# Patient Record
Sex: MALE | Race: WHITE | URBAN - METROPOLITAN AREA
[De-identification: names, ages, dates, MRNs, and addresses within clinical notes are randomized per-mention and may not be internally consistent; named-entity substitution may affect disease eponyms.]

---

## 2018-06-17 ENCOUNTER — EMERGENCY (EMERGENCY)
Facility: HOSPITAL | Age: 73
LOS: 0 days | Discharge: HOME | End: 2018-06-17
Attending: STUDENT IN AN ORGANIZED HEALTH CARE EDUCATION/TRAINING PROGRAM
Payer: MEDICARE

## 2018-06-17 VITALS
RESPIRATION RATE: 17 BRPM | DIASTOLIC BLOOD PRESSURE: 86 MMHG | SYSTOLIC BLOOD PRESSURE: 141 MMHG | HEART RATE: 52 BPM | WEIGHT: 126.99 LBS | HEIGHT: 73 IN | OXYGEN SATURATION: 97 % | TEMPERATURE: 98 F

## 2018-06-17 VITALS
TEMPERATURE: 97 F | RESPIRATION RATE: 17 BRPM | HEART RATE: 67 BPM | DIASTOLIC BLOOD PRESSURE: 60 MMHG | OXYGEN SATURATION: 96 % | SYSTOLIC BLOOD PRESSURE: 157 MMHG

## 2018-06-17 DIAGNOSIS — S09.90XA UNSPECIFIED INJURY OF HEAD, INITIAL ENCOUNTER: ICD-10-CM

## 2018-06-17 DIAGNOSIS — S20.20XA CONTUSION OF THORAX, UNSPECIFIED, INITIAL ENCOUNTER: ICD-10-CM

## 2018-06-17 DIAGNOSIS — Z85.028 PERSONAL HISTORY OF OTHER MALIGNANT NEOPLASM OF STOMACH: ICD-10-CM

## 2018-06-17 DIAGNOSIS — I10 ESSENTIAL (PRIMARY) HYPERTENSION: ICD-10-CM
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
## 2018-06-17 PROCEDURE — 72125 CT NECK SPINE W/O DYE: CPT | Mod: 26

## 2018-06-17 PROCEDURE — 99284 EMERGENCY DEPT VISIT MOD MDM: CPT

## 2018-06-17 PROCEDURE — 71250 CT THORAX DX C-: CPT | Mod: 26

## 2018-06-17 PROCEDURE — 76377 3D RENDER W/INTRP POSTPROCES: CPT | Mod: 26

## 2018-06-17 PROCEDURE — 70450 CT HEAD/BRAIN W/O DYE: CPT | Mod: 26

## 2018-06-17 RX ORDER — TETANUS TOXOID, REDUCED DIPHTHERIA TOXOID AND ACELLULAR PERTUSSIS VACCINE, ADSORBED 5; 2.5; 8; 8; 2.5 [IU]/.5ML; [IU]/.5ML; UG/.5ML; UG/.5ML; UG/.5ML
0.5 SUSPENSION INTRAMUSCULAR ONCE
Qty: 0 | Refills: 0 | Status: COMPLETED | OUTPATIENT
Start: 2018-06-17 | End: 2018-06-17

## 2018-06-17 RX ORDER — TRAMADOL HYDROCHLORIDE 50 MG/1
50 TABLET ORAL ONCE
Qty: 0 | Refills: 0 | Status: DISCONTINUED | OUTPATIENT
Start: 2018-06-17 | End: 2018-06-17

## 2018-06-17 RX ADMIN — TETANUS TOXOID, REDUCED DIPHTHERIA TOXOID AND ACELLULAR PERTUSSIS VACCINE, ADSORBED 0.5 MILLILITER(S): 5; 2.5; 8; 8; 2.5 SUSPENSION INTRAMUSCULAR at 12:23

## 2018-06-17 RX ADMIN — TRAMADOL HYDROCHLORIDE 50 MILLIGRAM(S): 50 TABLET ORAL at 12:23

## 2018-06-17 RX ADMIN — TRAMADOL HYDROCHLORIDE 50 MILLIGRAM(S): 50 TABLET ORAL at 13:52

## 2018-06-17 NOTE — ED PROVIDER NOTE - SKIN, MLM
Skin normal color for race, warm, dry and intact. No evidence of rash. +skin tears bilateral arms +small area of erythema left mid anterior chest wall

## 2018-06-17 NOTE — ED PROVIDER NOTE - OBJECTIVE STATEMENT
73 year old male presents today s/o trip and fall at home, pt stepped up to go into another room when he lost his balance and fell forward striking his forehead 73 year old male presents today s/o trip and fall at home, pt stepped up to go into another room when he lost his balance and fell forward striking his forehead and left chest wall (-) loc (-) dizzy or lightheaded (-) nausea or vomiting  (-) sob (-) abdominal pain

## 2018-06-17 NOTE — ED ADULT NURSE NOTE - OBJECTIVE STATEMENT
patient received, alert and oriented x4, s/p slip and fall in the bathroom around 0300, patient missed a step, stated hit frontal head on a table, no lac no bleeding noted, denies taking blood thinner, noted skin tear to right arm. denies passing out, denies dizziness, denies n/v.

## 2018-06-17 NOTE — ED PROVIDER NOTE - MEDICAL DECISION MAKING DETAILS
pt presented s/p trip and fall striking his head and chest contusion, ct head shows some hypoattenuation possibly edema, ct chest shows bilateral pulmonary effusions and nodules suspicious for neoplasm, pt has a known h/o stomach cancer on chemo, he states that he recently had fluid removed from his lungs and has followup, he does not want to stay and will see his oncologist, pt told to follow up tomorrow, pt is alert and oriented x3

## 2018-06-17 NOTE — ED ADULT TRIAGE NOTE - CHIEF COMPLAINT QUOTE
Laceration to arms sustain after fall. The right arm skin tear tripped and fall at home coming from the bathroom at 3am this am.

## 2018-07-11 ENCOUNTER — INPATIENT (INPATIENT)
Facility: HOSPITAL | Age: 73
LOS: 5 days | Discharge: ANOTHER HOSPITAL | End: 2018-07-17
Attending: HOSPITALIST | Admitting: HOSPITALIST
Payer: MEDICARE

## 2018-07-11 VITALS
HEART RATE: 52 BPM | TEMPERATURE: 98 F | RESPIRATION RATE: 19 BRPM | DIASTOLIC BLOOD PRESSURE: 64 MMHG | OXYGEN SATURATION: 96 % | SYSTOLIC BLOOD PRESSURE: 137 MMHG | WEIGHT: 130.07 LBS

## 2018-07-11 DIAGNOSIS — R19.7 DIARRHEA, UNSPECIFIED: ICD-10-CM

## 2018-07-11 DIAGNOSIS — J91.0 MALIGNANT PLEURAL EFFUSION: ICD-10-CM

## 2018-07-11 LAB
ALBUMIN SERPL ELPH-MCNC: 2.8 G/DL — LOW (ref 3.3–5)
ALP SERPL-CCNC: 63 U/L — SIGNIFICANT CHANGE UP (ref 40–120)
ALT FLD-CCNC: 11 U/L — LOW (ref 12–78)
ANION GAP SERPL CALC-SCNC: 9 MMOL/L — SIGNIFICANT CHANGE UP (ref 5–17)
AST SERPL-CCNC: 16 U/L — SIGNIFICANT CHANGE UP (ref 15–37)
BILIRUB SERPL-MCNC: 0.5 MG/DL — SIGNIFICANT CHANGE UP (ref 0.2–1.2)
BUN SERPL-MCNC: 23 MG/DL — SIGNIFICANT CHANGE UP (ref 7–23)
CALCIUM SERPL-MCNC: 9.5 MG/DL — SIGNIFICANT CHANGE UP (ref 8.5–10.1)
CHLORIDE SERPL-SCNC: 107 MMOL/L — SIGNIFICANT CHANGE UP (ref 96–108)
CO2 SERPL-SCNC: 24 MMOL/L — SIGNIFICANT CHANGE UP (ref 22–31)
CREAT SERPL-MCNC: 1.01 MG/DL — SIGNIFICANT CHANGE UP (ref 0.5–1.3)
GLUCOSE SERPL-MCNC: 92 MG/DL — SIGNIFICANT CHANGE UP (ref 70–99)
HCT VFR BLD CALC: 35.9 % — LOW (ref 39–50)
HGB BLD-MCNC: 12.1 G/DL — LOW (ref 13–17)
MCHC RBC-ENTMCNC: 30.6 PG — SIGNIFICANT CHANGE UP (ref 27–34)
MCHC RBC-ENTMCNC: 33.7 GM/DL — SIGNIFICANT CHANGE UP (ref 32–36)
MCV RBC AUTO: 90.9 FL — SIGNIFICANT CHANGE UP (ref 80–100)
NRBC # BLD: 0 /100 WBCS — SIGNIFICANT CHANGE UP (ref 0–0)
PLATELET # BLD AUTO: 196 K/UL — SIGNIFICANT CHANGE UP (ref 150–400)
POTASSIUM SERPL-MCNC: 4.2 MMOL/L — SIGNIFICANT CHANGE UP (ref 3.5–5.3)
POTASSIUM SERPL-SCNC: 4.2 MMOL/L — SIGNIFICANT CHANGE UP (ref 3.5–5.3)
PROT SERPL-MCNC: 6.5 GM/DL — SIGNIFICANT CHANGE UP (ref 6–8.3)
RBC # BLD: 3.95 M/UL — LOW (ref 4.2–5.8)
RBC # FLD: 12.9 % — SIGNIFICANT CHANGE UP (ref 10.3–14.5)
SODIUM SERPL-SCNC: 140 MMOL/L — SIGNIFICANT CHANGE UP (ref 135–145)
WBC # BLD: 4.69 K/UL — SIGNIFICANT CHANGE UP (ref 3.8–10.5)
WBC # FLD AUTO: 4.69 K/UL — SIGNIFICANT CHANGE UP (ref 3.8–10.5)

## 2018-07-11 PROCEDURE — 93010 ELECTROCARDIOGRAM REPORT: CPT

## 2018-07-11 PROCEDURE — 99285 EMERGENCY DEPT VISIT HI MDM: CPT

## 2018-07-11 PROCEDURE — 99223 1ST HOSP IP/OBS HIGH 75: CPT

## 2018-07-11 PROCEDURE — 74177 CT ABD & PELVIS W/CONTRAST: CPT | Mod: 26

## 2018-07-11 RX ORDER — IOHEXOL 300 MG/ML
30 INJECTION, SOLUTION INTRAVENOUS ONCE
Qty: 0 | Refills: 0 | Status: COMPLETED | OUTPATIENT
Start: 2018-07-11 | End: 2018-07-11

## 2018-07-11 RX ORDER — CIPROFLOXACIN LACTATE 400MG/40ML
500 VIAL (ML) INTRAVENOUS ONCE
Qty: 0 | Refills: 0 | Status: COMPLETED | OUTPATIENT
Start: 2018-07-11 | End: 2018-07-11

## 2018-07-11 RX ORDER — METRONIDAZOLE 500 MG
500 TABLET ORAL ONCE
Qty: 0 | Refills: 0 | Status: COMPLETED | OUTPATIENT
Start: 2018-07-11 | End: 2018-07-11

## 2018-07-11 RX ORDER — ENOXAPARIN SODIUM 100 MG/ML
40 INJECTION SUBCUTANEOUS EVERY 24 HOURS
Qty: 0 | Refills: 0 | Status: DISCONTINUED | OUTPATIENT
Start: 2018-07-11 | End: 2018-07-17

## 2018-07-11 RX ORDER — OXYCODONE AND ACETAMINOPHEN 5; 325 MG/1; MG/1
1 TABLET ORAL EVERY 6 HOURS
Qty: 0 | Refills: 0 | Status: DISCONTINUED | OUTPATIENT
Start: 2018-07-11 | End: 2018-07-16

## 2018-07-11 RX ORDER — SODIUM CHLORIDE 9 MG/ML
1000 INJECTION INTRAMUSCULAR; INTRAVENOUS; SUBCUTANEOUS ONCE
Qty: 0 | Refills: 0 | Status: COMPLETED | OUTPATIENT
Start: 2018-07-11 | End: 2018-07-11

## 2018-07-11 RX ORDER — ONDANSETRON 8 MG/1
4 TABLET, FILM COATED ORAL EVERY 6 HOURS
Qty: 0 | Refills: 0 | Status: DISCONTINUED | OUTPATIENT
Start: 2018-07-11 | End: 2018-07-17

## 2018-07-11 RX ADMIN — IOHEXOL 30 MILLILITER(S): 300 INJECTION, SOLUTION INTRAVENOUS at 13:04

## 2018-07-11 RX ADMIN — Medication 500 MILLIGRAM(S): at 17:55

## 2018-07-11 RX ADMIN — SODIUM CHLORIDE 1000 MILLILITER(S): 9 INJECTION INTRAMUSCULAR; INTRAVENOUS; SUBCUTANEOUS at 13:04

## 2018-07-11 NOTE — H&P ADULT - ASSESSMENT
73m with mitotic history with diarrhea and family telling ems they cannot take care of him     IMPROVE VTE Individual Risk Assessment        RISK                                                          Points  [  ] Previous VTE                                                3  [  ] Thrombophilia                                             2  [  ] Lower limb paralysis                                   2        (unable to hold up >15 seconds)    [ x ] Current Cancer                                            2         (within 6 months)  [ x ] Immobilization > 24 hrs                              1  [  ] ICU/CCU stay > 24 hours                            1  [x  ] Age > 60                                                    1  IMPROVE VTE Score ______4___    lovenox

## 2018-07-11 NOTE — H&P ADULT - HISTORY OF PRESENT ILLNESS
· HPI Objective Statement: 72 yo M with diarrhea.  Pt. sent in by family because he was covered in feces.  Pt. admits to diarrhea, abd pain, no other complaints.  Pt. currently getting chemo for lung Ca.  Pt's family told EMS, "we can't clean him all the time,"    	ROS: negative for fever, cough, headache, chest pain, shortness of breath, abd pain, nausea, vomiting, diarrhea, rash, paresthesia, and weakness--all other systems reviewed are negative.   PMH: HTN, stomach cancer, ?lung cancer; Meds: See EMR; SH: Denies smoking/drinking/drug use

## 2018-07-11 NOTE — ED ADULT NURSE NOTE - ED STAT RN HANDOFF DETAILS
report given to hold RN. PT ALERT AND ORIENTED BY 3. PT DENIES ANY PAIN AND IS COMFORTABLE IN BED EATING DINNER.

## 2018-07-11 NOTE — ED PROVIDER NOTE - PHYSICAL EXAMINATION
Vitals: WNL  Gen: AAOx3, NAD, sitting comfortably in stretcher  Head: ncat, perrla, eomi b/l  Neck: supple, no lymphadenopathy, no midline deviation  Heart: rrr, no m/r/g  Lungs: CTA b/l, no rales/ronchi/wheezes  Abd: soft, nontender, non-distended, no rebound or guarding  Ext: no clubbing/cyanosis/edema, tried stool on hands (brown)  Neuro: sensation and muscle strength intact b/l, moving all 4 ext, no focal deficits, CN2-12 intact b/l

## 2018-07-11 NOTE — H&P ADULT - NSHPLABSRESULTS_GEN_ALL_CORE
12.1   4.69  )-----------( 196      ( 11 Jul 2018 12:58 )             35.9   07-11    140  |  107  |  23  ----------------------------<  92  4.2   |  24  |  1.01    Ca    9.5      11 Jul 2018 12:58    TPro  6.5  /  Alb  2.8<L>  /  TBili  0.5  /  DBili  x   /  AST  16  /  ALT  11<L>  /  AlkPhos  63  07-11  < from: CT Abdomen and Pelvis w/ Oral Cont and w/ IV Cont (07.11.18 @ 15:38) >      IMPRESSION: Bibasilar patchy opacities as well as focal enhancing opacity   at the right lung base. It addition there are multiple left lower lobe   nodules measuring up to 9 mm concerning for neoplasm. 6 week follow-up   chest CT is recommended.    Bilateral pleural effusions, moderate and loculated on the right, and   small on the left.    Large amount stool within the rectum with mild rectal wall thickening.   Correlate clinically for stercoral colitis.

## 2018-07-11 NOTE — H&P ADULT - PROBLEM SELECTOR PLAN 2
Normal rate, regular rhythm.  Heart sounds S1, S2.  No murmurs, rubs or gallops. ir to do thoracentesis

## 2018-07-11 NOTE — ED ADULT TRIAGE NOTE - CHIEF COMPLAINT QUOTE
patient c/o of diarrhea for 2 days , denied any pain at the time of triage , patient is cover in feces , as per EMS family call the ambulance because " we can  not clean him all the time " as per family , patient is on chemo for lung CA , denied difficulty breathing

## 2018-07-11 NOTE — H&P ADULT - NSHPPHYSICALEXAM_GEN_ALL_CORE
GENERAL: NAD well-developed  HEAD:  Atraumatic, Normocephalic  EYES: EOMI, PERRLA, conjunctiva and sclera clear  ENMT: No tonsillar erythema, exudates, or enlargement; Moist mucous membranes, Good dentition, No lesions  NECK: Supple, No JVD, Normal thyroid  NERVOUS SYSTEM:  Alert & Oriented X3, Good concentration; Motor Strength 5/5 B/L upper and lower extremities; DTRs 2+ intact and symmetric  CHEST/LUNG: Clear to percussion bilaterally; No rales, rhonchi, wheezing, or rubs  HEART: Regular rate and rhythm; No murmurs, rubs, or gallops  ABDOMEN: Soft, Nontender, Nondistended; Bowel sounds present  EXTREMITIES:  2+ Peripheral Pulses, No clubbing, cyanosis, or edema  LYMPH: No lymphadenopathy   SKIN: No rashes or lesions GENERAL: NAD cachectic   HEAD:  Atraumatic, Normocephalic  EYES: EOMI, PERRLA, conjunctiva and sclera clear  ENMT: No tonsillar erythema, exudates, or enlargement; Moist mucous membranes, Good dentition, No lesions  NECK: Supple, No JVD, Normal thyroid  NERVOUS SYSTEM:  Alert & Oriented X3, Good concentration; Motor Strength 5/5 B/L upper and lower extremities; DTRs 2+ intact and symmetric  CHEST/LUNG: decreased breath sounds to right hemithorax   HEART: Regular rate and rhythm; No murmurs, rubs, or gallops  ABDOMEN: Soft, Nontender, Nondistended; Bowel sounds present  EXTREMITIES:  2+ Peripheral Pulses, No clubbing, cyanosis, or edema  LYMPH: No lymphadenopathy   SKIN: No rashes or lesions

## 2018-07-11 NOTE — PHARMACY COMMUNICATION NOTE - REASON FOR NOTE
at the time of verifying order the height was not in header but was in the flow sheets. Spoke with nurse and confirmed the height to be 6 feet

## 2018-07-11 NOTE — ED PROVIDER NOTE - OBJECTIVE STATEMENT
74 yo M with diarrhea.  Pt. sent in by family because he was covered in feces.  Pt. admits to diarrhea, abd pain, no other complaints.  Pt. currently getting chemo for lung Ca.  Pt's family told EMS, "we can't clean him all the time,"    ROS: negative for fever, cough, headache, chest pain, shortness of breath, abd pain, nausea, vomiting, diarrhea, rash, paresthesia, and weakness--all other systems reviewed are negative.   PMH: HTN, stomach cancer, ?lung cancer; Meds: See EMR; SH: Denies smoking/drinking/drug use

## 2018-07-11 NOTE — H&P ADULT - NSHPREVIEWOFSYSTEMS_GEN_ALL_CORE
CONSTITUTIONAL: No fever, weight loss, or fatigue  EYES: No eye pain, visual disturbances, or discharge  ENMT:  No difficulty hearing, tinnitus, vertigo; No sinus or throat pain  NECK: No pain or stiffness  RESPIRATORY: No cough, wheezing, chills or hemoptysis; No shortness of breath  CARDIOVASCULAR: No chest pain, palpitations, dizziness, or leg swelling  GASTROINTESTINAL: No abdominal or epigastric pain. No nausea, vomiting, or hematemesis; No diarrhea or constipation. No melena or hematochezia.  GENITOURINARY: No dysuria, frequency, hematuria, or incontinence  NEUROLOGICAL: No headaches, memory loss, loss of strength, numbness, or tremors  SKIN: No itching, burning, rashes, or lesions   LYMPH NODES: No enlarged glands  ENDOCRINE: No heat or cold intolerance; No hair loss  MUSCULOSKELETAL: No joint pain or swelling; No muscle, back, or extremity pain  PSYCHIATRIC: No depression, anxiety, mood swings, or difficulty sleeping  HEME/LYMPH: No easy bruising, or bleeding gums  ALLERGY AND IMMUNOLOGIC: No hives or eczema CONSTITUTIONAL:, significant weight loss, and  fatigue  EYES: No eye pain, visual disturbances, or discharge  ENMT:  No difficulty hearing, tinnitus, vertigo; No sinus or throat pain  NECK: No pain or stiffness  RESPIRATORY: No cough, wheezing, chills or hemoptysis pos  shortness of breath  CARDIOVASCULAR: No chest pain, palpitations, dizziness, or leg swelling  GASTROINTESTINAL: No abdominal or epigastric pain. No nausea, vomiting, or hematemesis; No diarrhea or constipation. No melena or hematochezia.  GENITOURINARY: No dysuria, frequency, hematuria, or incontinence  NEUROLOGICAL: No headaches, memory loss, loss of strength, numbness, or tremors  SKIN: No itching, burning, rashes, or lesions   LYMPH NODES: No enlarged glands  ENDOCRINE: No heat or cold intolerance; No hair loss  MUSCULOSKELETAL: No joint pain or swelling; No muscle, back, or extremity pain  PSYCHIATRIC: No depression, anxiety, mood swings, or difficulty sleeping  HEME/LYMPH: No easy bruising, or bleeding gums  ALLERGY AND IMMUNOLOGIC: No hives or eczema

## 2018-07-11 NOTE — ED PROVIDER NOTE - CARE PLAN
Principal Discharge DX:	Diarrhea of infectious origin Principal Discharge DX:	Diarrhea of infectious origin  Secondary Diagnosis:	Weakness

## 2018-07-12 DIAGNOSIS — Z29.9 ENCOUNTER FOR PROPHYLACTIC MEASURES, UNSPECIFIED: ICD-10-CM

## 2018-07-12 DIAGNOSIS — K52.9 NONINFECTIVE GASTROENTERITIS AND COLITIS, UNSPECIFIED: ICD-10-CM

## 2018-07-12 DIAGNOSIS — C34.92 MALIGNANT NEOPLASM OF UNSPECIFIED PART OF LEFT BRONCHUS OR LUNG: ICD-10-CM

## 2018-07-12 LAB
ANION GAP SERPL CALC-SCNC: 9 MMOL/L — SIGNIFICANT CHANGE UP (ref 5–17)
BUN SERPL-MCNC: 21 MG/DL — SIGNIFICANT CHANGE UP (ref 7–23)
CALCIUM SERPL-MCNC: 9.2 MG/DL — SIGNIFICANT CHANGE UP (ref 8.5–10.1)
CHLORIDE SERPL-SCNC: 105 MMOL/L — SIGNIFICANT CHANGE UP (ref 96–108)
CO2 SERPL-SCNC: 27 MMOL/L — SIGNIFICANT CHANGE UP (ref 22–31)
CREAT SERPL-MCNC: 0.91 MG/DL — SIGNIFICANT CHANGE UP (ref 0.5–1.3)
GLUCOSE SERPL-MCNC: 92 MG/DL — SIGNIFICANT CHANGE UP (ref 70–99)
HCT VFR BLD CALC: 34.5 % — LOW (ref 39–50)
HGB BLD-MCNC: 11.3 G/DL — LOW (ref 13–17)
MCHC RBC-ENTMCNC: 30 PG — SIGNIFICANT CHANGE UP (ref 27–34)
MCHC RBC-ENTMCNC: 32.8 GM/DL — SIGNIFICANT CHANGE UP (ref 32–36)
MCV RBC AUTO: 91.5 FL — SIGNIFICANT CHANGE UP (ref 80–100)
NRBC # BLD: 0 /100 WBCS — SIGNIFICANT CHANGE UP (ref 0–0)
PLATELET # BLD AUTO: 182 K/UL — SIGNIFICANT CHANGE UP (ref 150–400)
POTASSIUM SERPL-MCNC: 3.8 MMOL/L — SIGNIFICANT CHANGE UP (ref 3.5–5.3)
POTASSIUM SERPL-SCNC: 3.8 MMOL/L — SIGNIFICANT CHANGE UP (ref 3.5–5.3)
RBC # BLD: 3.77 M/UL — LOW (ref 4.2–5.8)
RBC # FLD: 13.1 % — SIGNIFICANT CHANGE UP (ref 10.3–14.5)
SODIUM SERPL-SCNC: 141 MMOL/L — SIGNIFICANT CHANGE UP (ref 135–145)
WBC # BLD: 5.38 K/UL — SIGNIFICANT CHANGE UP (ref 3.8–10.5)
WBC # FLD AUTO: 5.38 K/UL — SIGNIFICANT CHANGE UP (ref 3.8–10.5)

## 2018-07-12 PROCEDURE — 99233 SBSQ HOSP IP/OBS HIGH 50: CPT

## 2018-07-12 RX ORDER — LACTOBACILLUS ACIDOPHILUS 100MM CELL
1 CAPSULE ORAL DAILY
Qty: 0 | Refills: 0 | Status: DISCONTINUED | OUTPATIENT
Start: 2018-07-12 | End: 2018-07-17

## 2018-07-12 RX ORDER — MULTIVIT WITH MIN/MFOLATE/K2 340-15/3 G
290 POWDER (GRAM) ORAL ONCE
Qty: 0 | Refills: 0 | Status: COMPLETED | OUTPATIENT
Start: 2018-07-12 | End: 2018-07-12

## 2018-07-12 RX ORDER — CIPROFLOXACIN LACTATE 400MG/40ML
400 VIAL (ML) INTRAVENOUS ONCE
Qty: 0 | Refills: 0 | Status: COMPLETED | OUTPATIENT
Start: 2018-07-12 | End: 2018-07-12

## 2018-07-12 RX ORDER — LACTULOSE 10 G/15ML
20 SOLUTION ORAL ONCE
Qty: 0 | Refills: 0 | Status: COMPLETED | OUTPATIENT
Start: 2018-07-12 | End: 2018-07-12

## 2018-07-12 RX ORDER — CIPROFLOXACIN LACTATE 400MG/40ML
VIAL (ML) INTRAVENOUS
Qty: 0 | Refills: 0 | Status: DISCONTINUED | OUTPATIENT
Start: 2018-07-12 | End: 2018-07-17

## 2018-07-12 RX ORDER — METRONIDAZOLE 500 MG
500 TABLET ORAL ONCE
Qty: 0 | Refills: 0 | Status: COMPLETED | OUTPATIENT
Start: 2018-07-12 | End: 2018-07-12

## 2018-07-12 RX ORDER — CIPROFLOXACIN LACTATE 400MG/40ML
400 VIAL (ML) INTRAVENOUS EVERY 12 HOURS
Qty: 0 | Refills: 0 | Status: DISCONTINUED | OUTPATIENT
Start: 2018-07-12 | End: 2018-07-17

## 2018-07-12 RX ORDER — METRONIDAZOLE 500 MG
500 TABLET ORAL EVERY 8 HOURS
Qty: 0 | Refills: 0 | Status: DISCONTINUED | OUTPATIENT
Start: 2018-07-12 | End: 2018-07-17

## 2018-07-12 RX ORDER — METRONIDAZOLE 500 MG
TABLET ORAL
Qty: 0 | Refills: 0 | Status: DISCONTINUED | OUTPATIENT
Start: 2018-07-12 | End: 2018-07-17

## 2018-07-12 RX ORDER — LACTOBACILLUS ACIDOPH-L.BULGARICUS 1 MILLION CELL CHEWABLE TABLET 1MM CELL
1 TABLET,CHEWABLE ORAL DAILY
Qty: 0 | Refills: 0 | Status: DISCONTINUED | OUTPATIENT
Start: 2018-07-12 | End: 2018-07-12

## 2018-07-12 RX ADMIN — Medication 290 MILLILITER(S): at 18:13

## 2018-07-12 RX ADMIN — Medication 200 MILLIGRAM(S): at 18:14

## 2018-07-12 RX ADMIN — Medication 1 TABLET(S): at 13:20

## 2018-07-12 RX ADMIN — Medication 100 MILLIGRAM(S): at 13:20

## 2018-07-12 RX ADMIN — LACTULOSE 20 GRAM(S): 10 SOLUTION ORAL at 18:14

## 2018-07-12 RX ADMIN — Medication 100 MILLIGRAM(S): at 21:22

## 2018-07-12 RX ADMIN — ENOXAPARIN SODIUM 40 MILLIGRAM(S): 100 INJECTION SUBCUTANEOUS at 07:36

## 2018-07-12 RX ADMIN — Medication 200 MILLIGRAM(S): at 13:21

## 2018-07-12 NOTE — DIETITIAN INITIAL EVALUATION ADULT. - SIGNS/SYMPTOMS
physical signs of severe fat loss & muscle wasting as noted above 36% wt loss x 10 mos; physical signs of severe fat loss & muscle wasting as noted above

## 2018-07-12 NOTE — CHART NOTE - NSCHARTNOTEFT_GEN_A_CORE
Upon Nutritional Assessment by the Registered Dietitian your patient was determined to meet criteria / has evidence of the following diagnosis/diagnoses:          [ ]  Mild Protein Calorie Malnutrition        [ ]  Moderate Protein Calorie Malnutrition        [x ] Severe Protein Calorie Malnutrition        [ ] Unspecified Protein Calorie Malnutrition        [x ] Underweight / BMI <19        [ ] Morbid Obesity / BMI > 40      Findings as based on:  •  Comprehensive nutrition assessment and consultation  •  Calorie counts (nutrient intake analysis)  •  Food acceptance and intake status from observations by staff  •  Follow up  •  Patient education  •  Intervention secondary to interdisciplinary rounds  •   concerns      Treatment:    The following diet has been recommended:  Soft; Regular; Ensure Enlive x3/day (1050 kcals, 60g pro)      PROVIDER Section:     By signing this assessment you are acknowledging and agree with the diagnosis/diagnoses assigned by the Registered Dietitian    Comments:

## 2018-07-12 NOTE — DIETITIAN INITIAL EVALUATION ADULT. - NS AS NUTRI INTERV FEED ASSISTANCE
pt will be fed as per his wishes; food preferences obtained, alternate menu items offered/Feeding Assistance/Menu selection assistance

## 2018-07-12 NOTE — PHYSICAL THERAPY INITIAL EVALUATION ADULT - ADDITIONAL COMMENTS
As per patient and care coordination note, patient independent with rolling walker prior to hospitalization. Patient has an apartment in [place] but stays at his daughters home (1 step in and 12 inside) or sister's house in [place]

## 2018-07-12 NOTE — PHYSICAL THERAPY INITIAL EVALUATION ADULT - PERTINENT HX OF CURRENT PROBLEM, REHAB EVAL
Patient admitted with diarrhea, currently undergoing chemo for lung cancer. CT head shows B region of white anum hyperattnuation, possibly edema, CT chest shows B effusion, numerous pulmonary nodules most prominent in R lower lobe, Ct abd/pelvis shows large stool in rectum with mild rectal wall thickening, enlarged heterogeneously prostate gland and multiple enlarged retroperitoneal nodes. C/S imaging negative for fracture.

## 2018-07-12 NOTE — PROGRESS NOTE ADULT - SUBJECTIVE AND OBJECTIVE BOX
Patient is a 73y old  Male who presents with a chief complaint of diarrhea (12 Jul 2018 01:49)      OVERNIGHT EVENTS: none      REVIEW OF SYSTEMS: denies chest pain/SOB, diaphoresis, no F/C, cough, dizziness, headache, blurry vision, nausea, vomiting, abdominal pain. Rest unremarkable     MEDICATIONS  (STANDING):  ciprofloxacin   IVPB      ciprofloxacin   IVPB 400 milliGRAM(s) IV Intermittent once  ciprofloxacin   IVPB 400 milliGRAM(s) IV Intermittent every 12 hours  enoxaparin Injectable 40 milliGRAM(s) SubCutaneous every 24 hours  lactobacillus acidophilus 1 Tablet(s) Oral daily  metroNIDAZOLE  IVPB 500 milliGRAM(s) IV Intermittent once  metroNIDAZOLE  IVPB      metroNIDAZOLE  IVPB 500 milliGRAM(s) IV Intermittent every 8 hours    MEDICATIONS  (PRN):  ondansetron Injectable 4 milliGRAM(s) IV Push every 6 hours PRN Nausea and/or Vomiting  oxyCODONE    5 mG/acetaminophen 325 mG 1 Tablet(s) Oral every 6 hours PRN Moderate Pain (4 - 6)      Allergies    No Known Allergies    Intolerances        SUBJECTIVE: in bed in NAD, no acute events overnight     T(F): 97.8 (07-12-18 @ 10:44), Max: 98 (07-11-18 @ 11:30)  HR: 48 (07-12-18 @ 10:44) (48 - 79)  BP: 114/54 (07-12-18 @ 10:44) (114/54 - 149/60)  RR: 17 (07-12-18 @ 10:44) (16 - 19)  SpO2: 96% (07-12-18 @ 10:44) (95% - 98%)  Wt(kg): --    PHYSICAL EXAM:  GENERAL: NAD, well-groomed, well-developed  HEAD:  Atraumatic, Normocephalic  EYES: EOMI, PERRLA, conjunctiva and sclera clear  ENMT: No tonsillar erythema, exudates, or enlargement; Moist mucous membranes, Good dentition, No lesions  NECK: Supple, No JVD, Normal thyroid  CHEST/LUNG: Clear to  auscultation bilaterally; No rales, rhonchi, wheezing, or rubs  bilaterally  HEART: Regular rate and rhythm; No murmurs, rubs, or gallops  ABDOMEN: Soft, Nontender, Nondistended; Bowel sounds present  EXTREMITIES:  2+ Peripheral Pulses, No clubbing, cyanosis, or edema BL LE  SKIN: No rashes or lesions  NERVOUS SYSTEM:  Alert & Oriented X3, Good concentration; Motor Strength 5/5 B/L upper and lower extremities;   DTRs 2+ intact and symmetric, sensation intact BL    LABS:                        11.3   5.38  )-----------( 182      ( 12 Jul 2018 08:27 )             34.5     07-12    141  |  105  |  21  ----------------------------<  92  3.8   |  27  |  0.91    Ca    9.2      12 Jul 2018 08:27    TPro  6.5  /  Alb  2.8<L>  /  TBili  0.5  /  DBili  x   /  AST  16  /  ALT  11<L>  /  AlkPhos  63  07-11        Cultures;   CAPILLARY BLOOD GLUCOSE        Lipid panel:           RADIOLOGY & ADDITIONAL TESTS:  < from: CT Abdomen and Pelvis w/ Oral Cont and w/ IV Cont (07.11.18 @ 15:38) >    IMPRESSION: Bibasilar patchy opacities as well as focal enhancing opacity   at the right lung base. It addition there are multiple left lower lobe   nodules measuring up to 9 mm concerning for neoplasm. 6 week follow-up   chest CT is recommended.    Bilateral pleural effusions, moderate and loculated on the right, and   small on the left.    Large amount stool within the rectum with mild rectal wall thickening.   Correlate clinically for stercoral colitis.    Enlarged, heterogeneously enhancing prostate gland and multiple enlarged   retroperitoneal nodes.            Imaging Personally Reviewed:  [ x] YES      Consultant(s) Notes Reviewed:  [x ] YES     Care Discussed with [x ] Consultants [X ] Patient [x ] Family  [x ]    [x ]  Other; RN Patient is a 73y old  Male who presents with a chief complaint of diarrhea (12 Jul 2018 01:49)      OVERNIGHT EVENTS: none      REVIEW OF SYSTEMS: denies chest pain/SOB, diaphoresis, no F/C, cough, dizziness, headache, blurry vision, nausea, vomiting, abdominal pain. Rest unremarkable     MEDICATIONS  (STANDING):  ciprofloxacin   IVPB      ciprofloxacin   IVPB 400 milliGRAM(s) IV Intermittent once  ciprofloxacin   IVPB 400 milliGRAM(s) IV Intermittent every 12 hours  enoxaparin Injectable 40 milliGRAM(s) SubCutaneous every 24 hours  lactobacillus acidophilus 1 Tablet(s) Oral daily  metroNIDAZOLE  IVPB 500 milliGRAM(s) IV Intermittent once  metroNIDAZOLE  IVPB      metroNIDAZOLE  IVPB 500 milliGRAM(s) IV Intermittent every 8 hours    MEDICATIONS  (PRN):  ondansetron Injectable 4 milliGRAM(s) IV Push every 6 hours PRN Nausea and/or Vomiting  oxyCODONE    5 mG/acetaminophen 325 mG 1 Tablet(s) Oral every 6 hours PRN Moderate Pain (4 - 6)      Allergies    No Known Allergies    Intolerances        SUBJECTIVE: in bed in NAD, no acute events overnight     T(F): 97.8 (07-12-18 @ 10:44), Max: 98 (07-11-18 @ 11:30)  HR: 48 (07-12-18 @ 10:44) (48 - 79)  BP: 114/54 (07-12-18 @ 10:44) (114/54 - 149/60)  RR: 17 (07-12-18 @ 10:44) (16 - 19)  SpO2: 96% (07-12-18 @ 10:44) (95% - 98%)  Wt(kg): --    PHYSICAL EXAM:  GENERAL: NAD, well-groomed, well-developed  HEAD:  Atraumatic, Normocephalic  EYES: EOMI, PERRLA, conjunctiva and sclera clear  ENMT: No tonsillar erythema, exudates, or enlargement; Moist mucous membranes, Good dentition, No lesions  NECK: Supple, No JVD, Normal thyroid  CHEST/LUNG: Clear to  auscultation bilaterally; No rales, rhonchi, wheezing, or rubs  bilaterally  HEART: Regular rate and rhythm; No murmurs, rubs, or gallops  ABDOMEN: Soft, Nontender, Nondistended; Bowel sounds present  EXTREMITIES:  2+ Peripheral Pulses, No clubbing, cyanosis, or edema BL LE  SKIN: No rashes or lesions  NERVOUS SYSTEM:  Alert & Oriented X3, Good concentration; Motor Strength 5/5 B/L upper and lower extremities;   DTRs 2+ intact and symmetric, sensation intact BL    LABS:                        11.3   5.38  )-----------( 182      ( 12 Jul 2018 08:27 )             34.5     07-12			    141  |  105  |  21  ----------------------------<  92  3.8   |  27  |  0.91    Ca    9.2      12 Jul 2018 08:27    TPro  6.5  /  Alb  2.8<L>  /  TBili  0.5  /  DBili  x   /  AST  16  /  ALT  11<L>  /  AlkPhos  63  07-11        Cultures;   CAPILLARY BLOOD GLUCOSE        Lipid panel:           RADIOLOGY & ADDITIONAL TESTS:  < from: CT Abdomen and Pelvis w/ Oral Cont and w/ IV Cont (07.11.18 @ 15:38) >    IMPRESSION: Bibasilar patchy opacities as well as focal enhancing opacity   at the right lung base. It addition there are multiple left lower lobe   nodules measuring up to 9 mm concerning for neoplasm. 6 week follow-up   chest CT is recommended.    Bilateral pleural effusions, moderate and loculated on the right, and   small on the left.    Large amount stool within the rectum with mild rectal wall thickening.   Correlate clinically for stercoral colitis.    Enlarged, heterogeneously enhancing prostate gland and multiple enlarged   retroperitoneal nodes.            Imaging Personally Reviewed:  [ x] YES      Consultant(s) Notes Reviewed:  [x ] YES     Care Discussed with [x ] Consultants [X ] Patient [x ] Family  [x ]    [x ]  Other; RN

## 2018-07-12 NOTE — DIETITIAN INITIAL EVALUATION ADULT. - PHYSICAL APPEARANCE
emaciated/BMI 15.8; no edema; Nutrition focused physical exam conducted; Subcutaneous fat loss: [severe] Orbital fat pads region, [severe ]Buccal fat region, [severe ]Triceps region,  [unable ]Ribs region.  Muscle wasting: [severe ]Temples region, [severe ]Clavicle region, [severe ]Shoulder region, [unable ]Scapula region, [severe ]Interosseous region,  [severe ]thigh region, [severe ]Calf region

## 2018-07-12 NOTE — PROGRESS NOTE ADULT - PROBLEM SELECTOR PLAN 2
follow up stool studies   start antibx' and probiotics. follow up stool studies   start antibx' and probiotics.   call GI consult Dr. King

## 2018-07-12 NOTE — DIETITIAN INITIAL EVALUATION ADULT. - PERTINENT LABORATORY DATA
07-12 Na141 mmol/L Glu 92 mg/dL K+ 3.8 mmol/L Cr  0.91 mg/dL BUN 21 mg/dL Phos n/a   Alb n/a   PAB n/a

## 2018-07-12 NOTE — DIETITIAN INITIAL EVALUATION ADULT. - OTHER INFO
pt seen due to BMI of 15, PU stage II. pt c hx of stomach CA. pt had lunch tray in front of him, observed pt trying to eat but unable to feed himself. pt didn't want the CNA to help him. po intake ~30%. pt said it was taking up too much energy, he couldn't eat anymore. no N/V. pt seen due to BMI of 15, PU stage II. pt c hx of stomach CA. pt had lunch tray in front of him, observed pt trying to eat but unable to feed himself. pt didn't want the CNA to help him. pt says he has no difficulty chewing and refused altered texture, but it took him a very long time to chew each bite. po intake ~30%. pt said it was taking up too much energy, he couldn't eat anymore. no N/V. pt seen due to BMI of 15, PU stage II. pt c hx of stomach CA, now spread to his lungs. pt had lunch tray in front of him, observed pt trying to eat but unable to feed himself. pt didn't want the CNA to help him. pt says he has no difficulty chewing and refused altered texture, but it took him a very long time to chew each bite. po intake ~30%. pt said he couldn't eat anymore because it took too much energy. denies N/V.

## 2018-07-12 NOTE — DIETITIAN INITIAL EVALUATION ADULT. - NS AS NUTRI INTERV MEDICAL AND FOOD SUPPLEMENTS
Ensure Enlive x3/day (1050 kcals,/Commercial beverage Ensure Enlive x3/day (1050 kcals, 60g pro)-chocolate only/Commercial beverage

## 2018-07-12 NOTE — DIETITIAN INITIAL EVALUATION ADULT. - ORAL INTAKE PTA
poor/pt lives c his daughter. she does the grocery shopping and cooking. natural teeth; no difficulty chewing or swallowing. pt drinks Boost once or twice a week

## 2018-07-12 NOTE — PROGRESS NOTE ADULT - ASSESSMENT
72 y/o male with history with  diarrhea and family telling ems they cannot take care of him    has h/o lung cancer and stomach cancer 72 y/o male with history with  diarrhea and family telling ems they cannot take care of him    has h/o lung cancer s/p chemo and xrt per daughter started  a new chemo med with dr. kenneth gonzalez  called [phone]

## 2018-07-12 NOTE — DIETITIAN INITIAL EVALUATION ADULT. - ENERGY NEEDS
Height (cm): 185.42 (07-12)  Weight (kg): 54.2 (07-12)  BMI (kg/m2): 15.8 (07-12)  Ideal Body Weight: 83.6 kg +/- 10%  % Ideal Body Weight: 65%

## 2018-07-13 ENCOUNTER — RESULT REVIEW (OUTPATIENT)
Age: 73
End: 2018-07-13

## 2018-07-13 LAB
ALBUMIN SERPL ELPH-MCNC: 2.7 G/DL — LOW (ref 3.3–5)
ALP SERPL-CCNC: 61 U/L — SIGNIFICANT CHANGE UP (ref 40–120)
ALT FLD-CCNC: 12 U/L — SIGNIFICANT CHANGE UP (ref 12–78)
ANION GAP SERPL CALC-SCNC: 9 MMOL/L — SIGNIFICANT CHANGE UP (ref 5–17)
AST SERPL-CCNC: 19 U/L — SIGNIFICANT CHANGE UP (ref 15–37)
B PERT IGG+IGM PNL SER: CLEAR — SIGNIFICANT CHANGE UP
BASE EXCESS BLDV CALC-SCNC: 1.9 MMOL/L — SIGNIFICANT CHANGE UP (ref -2–2)
BILIRUB SERPL-MCNC: 0.5 MG/DL — SIGNIFICANT CHANGE UP (ref 0.2–1.2)
BLOOD GAS COMMENTS, VENOUS: SIGNIFICANT CHANGE UP
BUN SERPL-MCNC: 20 MG/DL — SIGNIFICANT CHANGE UP (ref 7–23)
C DIFF BY PCR RESULT: SIGNIFICANT CHANGE UP
C DIFF TOX GENS STL QL NAA+PROBE: SIGNIFICANT CHANGE UP
CALCIUM SERPL-MCNC: 9.2 MG/DL — SIGNIFICANT CHANGE UP (ref 8.5–10.1)
CHLORIDE SERPL-SCNC: 108 MMOL/L — SIGNIFICANT CHANGE UP (ref 96–108)
CK SERPL-CCNC: 32 U/L — SIGNIFICANT CHANGE UP (ref 26–308)
CO2 SERPL-SCNC: 24 MMOL/L — SIGNIFICANT CHANGE UP (ref 22–31)
COLOR FLD: YELLOW — SIGNIFICANT CHANGE UP
CREAT SERPL-MCNC: 0.86 MG/DL — SIGNIFICANT CHANGE UP (ref 0.5–1.3)
CULTURE RESULTS: SIGNIFICANT CHANGE UP
FLUID INTAKE SUBSTANCE CLASS: SIGNIFICANT CHANGE UP
FLUID SEGMENTED GRANULOCYTES: 3 % — SIGNIFICANT CHANGE UP
GAS PNL BLDV: SIGNIFICANT CHANGE UP
GLUCOSE FLD-MCNC: 107 MG/DL — SIGNIFICANT CHANGE UP
GLUCOSE SERPL-MCNC: 93 MG/DL — SIGNIFICANT CHANGE UP (ref 70–99)
GRAM STN FLD: SIGNIFICANT CHANGE UP
HCO3 BLDV-SCNC: 27 MMOL/L — SIGNIFICANT CHANGE UP (ref 21–29)
HCT VFR BLD CALC: 37.6 % — LOW (ref 39–50)
HGB BLD-MCNC: 12.3 G/DL — LOW (ref 13–17)
HOROWITZ INDEX BLDV+IHG-RTO: 21 — SIGNIFICANT CHANGE UP
INR BLD: 1.19 RATIO — HIGH (ref 0.88–1.16)
LDH SERPL L TO P-CCNC: 286 U/L — SIGNIFICANT CHANGE UP
LDH SERPL L TO P-CCNC: 404 U/L — HIGH (ref 50–242)
LYMPHOCYTES # FLD: 77 % — SIGNIFICANT CHANGE UP
MAGNESIUM SERPL-MCNC: 2.2 MG/DL — SIGNIFICANT CHANGE UP (ref 1.6–2.6)
MCHC RBC-ENTMCNC: 30.1 PG — SIGNIFICANT CHANGE UP (ref 27–34)
MCHC RBC-ENTMCNC: 32.7 GM/DL — SIGNIFICANT CHANGE UP (ref 32–36)
MCV RBC AUTO: 91.9 FL — SIGNIFICANT CHANGE UP (ref 80–100)
MONOS+MACROS # FLD: 20 % — SIGNIFICANT CHANGE UP
NRBC # BLD: 0 /100 WBCS — SIGNIFICANT CHANGE UP (ref 0–0)
PCO2 BLDV: 45 MMHG — SIGNIFICANT CHANGE UP (ref 35–50)
PH BLDV: 7.39 — SIGNIFICANT CHANGE UP (ref 7.35–7.45)
PH FLD: 7.7 — SIGNIFICANT CHANGE UP
PHOSPHATE SERPL-MCNC: 2.6 MG/DL — SIGNIFICANT CHANGE UP (ref 2.5–4.5)
PLATELET # BLD AUTO: 109 K/UL — LOW (ref 150–400)
PO2 BLDV: 112 MMHG — HIGH (ref 25–45)
POTASSIUM SERPL-MCNC: 4.3 MMOL/L — SIGNIFICANT CHANGE UP (ref 3.5–5.3)
POTASSIUM SERPL-SCNC: 4.3 MMOL/L — SIGNIFICANT CHANGE UP (ref 3.5–5.3)
PROT FLD-MCNC: 4.4 G/DL — SIGNIFICANT CHANGE UP
PROT SERPL-MCNC: 6.4 GM/DL — SIGNIFICANT CHANGE UP (ref 6–8.3)
PROTHROM AB SERPL-ACNC: 13 SEC — HIGH (ref 9.8–12.7)
RBC # BLD: 4.09 M/UL — LOW (ref 4.2–5.8)
RBC # FLD: 12.9 % — SIGNIFICANT CHANGE UP (ref 10.3–14.5)
RCV VOL RI: 385 /UL — HIGH (ref 0–5)
SAO2 % BLDV: 98 % — HIGH (ref 67–88)
SODIUM SERPL-SCNC: 141 MMOL/L — SIGNIFICANT CHANGE UP (ref 135–145)
SPECIMEN SOURCE FLD: SIGNIFICANT CHANGE UP
SPECIMEN SOURCE: SIGNIFICANT CHANGE UP
SPECIMEN SOURCE: SIGNIFICANT CHANGE UP
TOTAL NUCLEATED CELL COUNT, BODY FLUID: 230 /UL — HIGH (ref 0–5)
TUBE TYPE: SIGNIFICANT CHANGE UP
WBC # BLD: 4.7 K/UL — SIGNIFICANT CHANGE UP (ref 3.8–10.5)
WBC # FLD AUTO: 4.7 K/UL — SIGNIFICANT CHANGE UP (ref 3.8–10.5)
WRIGHT STN STL: NEGATIVE — SIGNIFICANT CHANGE UP

## 2018-07-13 PROCEDURE — 88305 TISSUE EXAM BY PATHOLOGIST: CPT | Mod: 26

## 2018-07-13 PROCEDURE — 71045 X-RAY EXAM CHEST 1 VIEW: CPT | Mod: 26

## 2018-07-13 PROCEDURE — 88112 CYTOPATH CELL ENHANCE TECH: CPT | Mod: 26

## 2018-07-13 PROCEDURE — 32555 ASPIRATE PLEURA W/ IMAGING: CPT

## 2018-07-13 PROCEDURE — 99233 SBSQ HOSP IP/OBS HIGH 50: CPT

## 2018-07-13 RX ADMIN — Medication 1 TABLET(S): at 13:53

## 2018-07-13 RX ADMIN — OXYCODONE AND ACETAMINOPHEN 1 TABLET(S): 5; 325 TABLET ORAL at 18:16

## 2018-07-13 RX ADMIN — Medication 200 MILLIGRAM(S): at 18:17

## 2018-07-13 RX ADMIN — Medication 100 MILLIGRAM(S): at 13:52

## 2018-07-13 RX ADMIN — Medication 100 MILLIGRAM(S): at 21:21

## 2018-07-13 RX ADMIN — Medication 200 MILLIGRAM(S): at 05:46

## 2018-07-13 RX ADMIN — Medication 100 MILLIGRAM(S): at 05:46

## 2018-07-13 RX ADMIN — OXYCODONE AND ACETAMINOPHEN 1 TABLET(S): 5; 325 TABLET ORAL at 18:45

## 2018-07-13 NOTE — PROGRESS NOTE ADULT - SUBJECTIVE AND OBJECTIVE BOX
Pt with Lung CA - treated with Chemo and RT  Pt given Mag citrate yesterday - had large BM overnight      MEDICATIONS  (STANDING):  ciprofloxacin   IVPB      ciprofloxacin   IVPB 400 milliGRAM(s) IV Intermittent every 12 hours  enoxaparin Injectable 40 milliGRAM(s) SubCutaneous every 24 hours  lactobacillus acidophilus 1 Tablet(s) Oral daily  metroNIDAZOLE  IVPB 500 milliGRAM(s) IV Intermittent every 8 hours  metroNIDAZOLE  IVPB        MEDICATIONS  (PRN):  ondansetron Injectable 4 milliGRAM(s) IV Push every 6 hours PRN Nausea and/or Vomiting  oxyCODONE    5 mG/acetaminophen 325 mG 1 Tablet(s) Oral every 6 hours PRN Moderate Pain (4 - 6)      Allergies    No Known Allergies    Intolerances        Vital Signs Last 24 Hrs  T(C): 36.9 (13 Jul 2018 05:35), Max: 36.9 (13 Jul 2018 05:35)  T(F): 98.4 (13 Jul 2018 05:35), Max: 98.4 (13 Jul 2018 05:35)  HR: 101 (13 Jul 2018 09:45) (43 - 101)  BP: 120/62 (13 Jul 2018 09:45) (120/62 - 139/52)  BP(mean): --  RR: 18 (13 Jul 2018 09:45) (16 - 18)  SpO2: 98% (13 Jul 2018 09:45) (96% - 98%)    PHYSICAL EXAM:  General: NAD.  CVS: S1, S2  Chest: air entry bilaterally present  Abd: BS present, soft, non-tender      LABS:                        12.3   4.70  )-----------( 109      ( 13 Jul 2018 07:57 )             37.6     07-13    141  |  108  |  20  ----------------------------<  93  4.3   |  24  |  0.86    Ca    9.2      13 Jul 2018 07:57  Phos  2.6     07-13  Mg     2.2     07-13    TPro  6.4  /  Alb  2.7<L>  /  TBili  0.5  /  DBili  x   /  AST  19  /  ALT  12  /  AlkPhos  61  07-13    PT/INR - ( 13 Jul 2018 07:57 )   PT: 13.0 sec;   INR: 1.19 ratio         diet as tolerated  stable from GI standpoint

## 2018-07-13 NOTE — PROGRESS NOTE ADULT - ASSESSMENT
74 y/o male with history with  diarrhea and family telling ems they cannot take care of him    has h/o lung cancer s/p chemo and xrt per daughter started  a new chemo med with dr. kenneth gonzalez  called [phone]

## 2018-07-13 NOTE — PROGRESS NOTE ADULT - SUBJECTIVE AND OBJECTIVE BOX
Patient is a 73y old  Male who presents with a chief complaint of diarrhea (12 Jul 2018 01:49)      OVERNIGHT EVENTS: none      REVIEW OF SYSTEMS: denies chest pain/SOB, diaphoresis, no F/C, cough, dizziness, headache, blurry vision, nausea, vomiting, abdominal pain. Rest unremarkable   MEDICATIONS  (STANDING):  ciprofloxacin   IVPB      ciprofloxacin   IVPB 400 milliGRAM(s) IV Intermittent every 12 hours  enoxaparin Injectable 40 milliGRAM(s) SubCutaneous every 24 hours  lactobacillus acidophilus 1 Tablet(s) Oral daily  metroNIDAZOLE  IVPB 500 milliGRAM(s) IV Intermittent every 8 hours  metroNIDAZOLE  IVPB        MEDICATIONS  (PRN):  ondansetron Injectable 4 milliGRAM(s) IV Push every 6 hours PRN Nausea and/or Vomiting  oxyCODONE    5 mG/acetaminophen 325 mG 1 Tablet(s) Oral every 6 hours PRN Moderate Pain (4 - 6)    Allergies    No Known Allergies    Intolerances        SUBJECTIVE: in bed in NAD, no acute events overnight     Vital Signs Last 24 Hrs  T(C): 35 (13 Jul 2018 11:40), Max: 36.9 (13 Jul 2018 05:35)  T(F): 95 (13 Jul 2018 11:40), Max: 98.4 (13 Jul 2018 05:35)  HR: 81 (13 Jul 2018 11:40) (43 - 101)  BP: 132/64 (13 Jul 2018 11:40) (120/62 - 139/52)  BP(mean): --  RR: 17 (13 Jul 2018 11:40) (16 - 18)  SpO2: 96% (13 Jul 2018 11:40) (96% - 98%)    PHYSICAL EXAM:  GENERAL: NAD, well-groomed, well-developed  HEAD:  Atraumatic, Normocephalic  EYES: EOMI, PERRLA, conjunctiva and sclera clear  ENMT: No tonsillar erythema, exudates, or enlargement; Moist mucous membranes, Good dentition, No lesions  NECK: Supple, No JVD, Normal thyroid  CHEST/LUNG: Clear to  auscultation bilaterally; No rales, rhonchi, wheezing, or rubs  bilaterally  HEART: Regular rate and rhythm; No murmurs, rubs, or gallops  ABDOMEN: Soft, Nontender, Nondistended; Bowel sounds present  EXTREMITIES:  2+ Peripheral Pulses, No clubbing, cyanosis, or edema BL LE  SKIN: No rashes or lesions  NERVOUS SYSTEM:  Alert & Oriented X3, Good concentration; Motor Strength 5/5 B/L upper and lower extremities;   DTRs 2+ intact and symmetric, sensation intact BL    LABS:                                 12.3   4.70  )-----------( 109      ( 13 Jul 2018 07:57 )             37.6       07-13    141  |  108  |  20  ----------------------------<  93  4.3   |  24  |  0.86    Ca    9.2      13 Jul 2018 07:57  Phos  2.6     07-13  Mg     2.2     07-13    TPro  6.4  /  Alb  2.7<L>  /  TBili  0.5  /  DBili  x   /  AST  19  /  ALT  12  /  AlkPhos  61  07-13      Cultures;   CAPILLARY BLOOD GLUCOSE        Lipid panel:           RADIOLOGY & ADDITIONAL TESTS:  < from: CT Abdomen and Pelvis w/ Oral Cont and w/ IV Cont (07.11.18 @ 15:38) >    IMPRESSION: Bibasilar patchy opacities as well as focal enhancing opacity   at the right lung base. It addition there are multiple left lower lobe   nodules measuring up to 9 mm concerning for neoplasm. 6 week follow-up   chest CT is recommended.    Bilateral pleural effusions, moderate and loculated on the right, and   small on the left.    Large amount stool within the rectum with mild rectal wall thickening.   Correlate clinically for stercoral colitis.    Enlarged, heterogeneously enhancing prostate gland and multiple enlarged   retroperitoneal nodes.            Imaging Personally Reviewed:  [ x] YES      Consultant(s) Notes Reviewed:  [x ] YES     Care Discussed with [x ] Consultants [X ] Patient [x ] Family  [x ]    [x ]  Other; RN

## 2018-07-13 NOTE — PROGRESS NOTE ADULT - SUBJECTIVE AND OBJECTIVE BOX
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  but currently living with dtr  PMH HTN, stomach cancer diagnosed 9/2017 [place] [place] has had ct and rt, lung cancer; malignant pl effusion was sent in with diarrhea when he found by family covered in feces and family declaring that they cannot take care of him at home Patient was admitted [facility] 7/11/2018 Patient was started on cipro flagyl (7/11/2018) for colitis and IR thoracentesis is arranged for large R pl effsn tbd 7/13/2018 Pulm consulted 7/12/2018     VITALS/LABS                           7/13/2018 afeb 43 120/50 96%  7/12/2018 W 5.3 Hb 11.3 Plt 182 Na 141 K 3.8 CO2 27 Cr .91     REVIEW OF SYMPTOMS    Able to give ROS  Yes     RELIABLE No   CONSTITUTIONAL Weakness Yes  Chills No Vision changes No  ENDOCRINE No unexplained hair loss No heat or cold intolerance    ALLERGY No hives  Sore throat No   RESP Coughing blood no  Shortness of breath YES   NEURO No Headache  Confusion Pain neck No   CARDIAC No Chest pain No Palpitations   GI No Pain abdomen NO   Vomiting NO     PHYSICAL EXAM    HEENT Unremarkable PERRLA atraumatic   RESP Fair air entry EXP prolonged    Harsh breath sound Resp distres mild   CARDIAC S1 S2 No S3     NO JVD    ABDOMEN SOFT BS PRESENT NOT DISTENDED No hepatosplenomegaly PEDAL EDEMA present No calf tenderness  NO rash   GENERAL Not TOXIC looking    PATIENT DATA ASSESSMENT RECOMMENDATIONS  7/11/2018 ADMISSION [facility]                        RESP   7/13/2018 RA 96%   ASSESMENT RECOMMENDATIONS   Target PO 90-95% adjust O2     PLEURAL EFFSNS   7/11/2018 CTAP oc ic   cc diffuse abdominal pain and diarrhea   1) BL pl effsns small on l and mod and loculated on r   6/17/2018 CT Ch   1) Mod bl pl effsns   ASSESSMENT RECOMMENDATIONS   7/12/2018 DW IR PA Thoracentesis planned for 7/13/2018     LUNG NODULES   7/11/2018 CTAP oc ic   1) Basal patchy opacities with more focal enhancing opacity R base   2) Multiple nodules lll upto 9 mm     6/17/2018 CT Ch   1)  Scattered nodular opacities both lungs hawa r lobe measuring upto 1.5 cm   2) Perihilar ggo   ASSESSMENT RECOMMENDATIONS   Likely metastatic cancer based on available history     BRADYCARDIA  7/13/2018 HR 45   7/13/2018 Consider Cradio eval Check TSH     COLITIS   7/11/2018 CTAP oc ic   cc diffuse abdominal pain and diarrhea   1) Large stool in rectum   2) Mild rectal wall thickening   3) Multiple enlarged rpln  ASSESSMENT RECOMMENDATIONS   On cip (7/11) flagyl (7/11)   LNE ? colon ca May need GI eval     PROSTATOMEGALY  7/11/2018 CTAP oc ic   Heterogenously enlarged prostate     GLOBAL ISSUE/BEST PRACTICE:      PROBLEM: HOB elevation:   y            PROBLEM: Stress ulcer proph:    na                      PROBLEM: VTE prophylaxis:      lvnx 40 (7/11)   PROBLEM: Glycemic control:    na  PROBLEM: Nutrition:    Reg diet (7/11)   PROBLEM: Advanced directive: na     PROBLEM: Allergies:  na      TIME SPENT Over 25 minutes aggregate care time spent on encounter; activities included   direct patient care, counseling and/or coordinating care reviewing notes, lab data/ imaging , discussion with multidisciplinary team/ patient  /family

## 2018-07-13 NOTE — PROGRESS NOTE ADULT - PROBLEM SELECTOR PLAN 2
most likely due  immunosuppressant   s/p novilmuab on 6/27/18  follow up stool studies   started  antibx and probiotics.  GI consult Dr. King noted   s/p large BM ( formed

## 2018-07-14 DIAGNOSIS — R00.1 BRADYCARDIA, UNSPECIFIED: ICD-10-CM

## 2018-07-14 DIAGNOSIS — E43 UNSPECIFIED SEVERE PROTEIN-CALORIE MALNUTRITION: ICD-10-CM

## 2018-07-14 LAB
ALBUMIN SERPL ELPH-MCNC: 2.8 G/DL — LOW (ref 3.3–5)
ALP SERPL-CCNC: 59 U/L — SIGNIFICANT CHANGE UP (ref 40–120)
ALT FLD-CCNC: 13 U/L — SIGNIFICANT CHANGE UP (ref 12–78)
ANION GAP SERPL CALC-SCNC: 7 MMOL/L — SIGNIFICANT CHANGE UP (ref 5–17)
AST SERPL-CCNC: 18 U/L — SIGNIFICANT CHANGE UP (ref 15–37)
BILIRUB SERPL-MCNC: 0.4 MG/DL — SIGNIFICANT CHANGE UP (ref 0.2–1.2)
BUN SERPL-MCNC: 21 MG/DL — SIGNIFICANT CHANGE UP (ref 7–23)
CALCIUM SERPL-MCNC: 8.9 MG/DL — SIGNIFICANT CHANGE UP (ref 8.5–10.1)
CHLORIDE SERPL-SCNC: 103 MMOL/L — SIGNIFICANT CHANGE UP (ref 96–108)
CO2 SERPL-SCNC: 28 MMOL/L — SIGNIFICANT CHANGE UP (ref 22–31)
CREAT SERPL-MCNC: 0.92 MG/DL — SIGNIFICANT CHANGE UP (ref 0.5–1.3)
GLUCOSE SERPL-MCNC: 94 MG/DL — SIGNIFICANT CHANGE UP (ref 70–99)
HCT VFR BLD CALC: 38.8 % — LOW (ref 39–50)
HGB BLD-MCNC: 12.6 G/DL — LOW (ref 13–17)
INR BLD: 1.2 RATIO — HIGH (ref 0.88–1.16)
MAGNESIUM SERPL-MCNC: 2 MG/DL — SIGNIFICANT CHANGE UP (ref 1.6–2.6)
MCHC RBC-ENTMCNC: 30.2 PG — SIGNIFICANT CHANGE UP (ref 27–34)
MCHC RBC-ENTMCNC: 32.5 GM/DL — SIGNIFICANT CHANGE UP (ref 32–36)
MCV RBC AUTO: 93 FL — SIGNIFICANT CHANGE UP (ref 80–100)
NIGHT BLUE STAIN TISS: SIGNIFICANT CHANGE UP
NRBC # BLD: 0 /100 WBCS — SIGNIFICANT CHANGE UP (ref 0–0)
PHOSPHATE SERPL-MCNC: 2.8 MG/DL — SIGNIFICANT CHANGE UP (ref 2.5–4.5)
PLATELET # BLD AUTO: 179 K/UL — SIGNIFICANT CHANGE UP (ref 150–400)
POTASSIUM SERPL-MCNC: 3.8 MMOL/L — SIGNIFICANT CHANGE UP (ref 3.5–5.3)
POTASSIUM SERPL-SCNC: 3.8 MMOL/L — SIGNIFICANT CHANGE UP (ref 3.5–5.3)
PROT SERPL-MCNC: 6.7 GM/DL — SIGNIFICANT CHANGE UP (ref 6–8.3)
PROTHROM AB SERPL-ACNC: 13.1 SEC — HIGH (ref 9.8–12.7)
PSA FLD-MCNC: 2.53 NG/ML — SIGNIFICANT CHANGE UP (ref 0–4)
RBC # BLD: 4.17 M/UL — LOW (ref 4.2–5.8)
RBC # FLD: 12.9 % — SIGNIFICANT CHANGE UP (ref 10.3–14.5)
SODIUM SERPL-SCNC: 138 MMOL/L — SIGNIFICANT CHANGE UP (ref 135–145)
SPECIMEN SOURCE: SIGNIFICANT CHANGE UP
T4 FREE SERPL-MCNC: 1.3 NG/DL — SIGNIFICANT CHANGE UP (ref 0.9–1.8)
TSH SERPL-MCNC: 11.6 UIU/ML — HIGH (ref 0.36–3.74)
WBC # BLD: 3.84 K/UL — SIGNIFICANT CHANGE UP (ref 3.8–10.5)
WBC # FLD AUTO: 3.84 K/UL — SIGNIFICANT CHANGE UP (ref 3.8–10.5)

## 2018-07-14 PROCEDURE — 99233 SBSQ HOSP IP/OBS HIGH 50: CPT

## 2018-07-14 RX ORDER — LEVOTHYROXINE SODIUM 125 MCG
50 TABLET ORAL DAILY
Qty: 0 | Refills: 0 | Status: DISCONTINUED | OUTPATIENT
Start: 2018-07-15 | End: 2018-07-17

## 2018-07-14 RX ADMIN — Medication 100 MILLIGRAM(S): at 14:00

## 2018-07-14 RX ADMIN — Medication 1 TABLET(S): at 11:43

## 2018-07-14 RX ADMIN — Medication 100 MILLIGRAM(S): at 21:24

## 2018-07-14 RX ADMIN — Medication 200 MILLIGRAM(S): at 05:02

## 2018-07-14 RX ADMIN — Medication 200 MILLIGRAM(S): at 17:38

## 2018-07-14 RX ADMIN — OXYCODONE AND ACETAMINOPHEN 1 TABLET(S): 5; 325 TABLET ORAL at 18:25

## 2018-07-14 RX ADMIN — OXYCODONE AND ACETAMINOPHEN 1 TABLET(S): 5; 325 TABLET ORAL at 17:55

## 2018-07-14 RX ADMIN — Medication 100 MILLIGRAM(S): at 05:02

## 2018-07-14 RX ADMIN — OXYCODONE AND ACETAMINOPHEN 1 TABLET(S): 5; 325 TABLET ORAL at 00:18

## 2018-07-14 RX ADMIN — OXYCODONE AND ACETAMINOPHEN 1 TABLET(S): 5; 325 TABLET ORAL at 01:00

## 2018-07-14 NOTE — PROGRESS NOTE ADULT - ASSESSMENT
74 y/o male with history with  diarrhea and family telling ems they cannot take care of him    has h/o lung cancer s/p chemo and xrt per daughter started  a new chemo med with dr. kenneth gonzalez  called [phone]

## 2018-07-14 NOTE — PROGRESS NOTE ADULT - ATTENDING COMMENTS
per verbal d/w with PT on 7/13/18 reccs are for  FERNANDA    conveyed to SW pt should be ready for d.c on Monday per verbal d/w with PT on 7/13/18 reccs are for  FERNANDA    conveyed to SW pt should be ready for d.c on Monday   update daughter Aranza via phone

## 2018-07-14 NOTE — PROGRESS NOTE ADULT - SUBJECTIVE AND OBJECTIVE BOX
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  but currently living with dtr  PMH HTN, Metastatic lung cancer diagnosed 9/2017 [place] has had ct and rt, lung cancer; malignant pl effusion Rxed with Nivoluman (OPTIVO)  was sent in with diarrhea when he found by family covered in feces and family declaring that they cannot take care of him at home Patient was admitted [facility] 7/11/2018 Patient was started on cipro flagyl (7/11/2018) for colitis and IR thoracentesis is arranged for large R pl effsn tbd 7/13/2018 Pulm consulted 7/12/2018   ONCOLOGIST Dr Elliot Ricci [phone]     VITALS/LABS                           7/14/2018 afeb 72 116/47 16 97% 54   7/14/2018 W 3.8 Hb 12.6 Plt 179 INR 1.20 Na 138 K 3.8 CO2 28 Cr .9     REVIEW OF SYMPTOMS    Able to give ROS  Yes     RELIABLE No   CONSTITUTIONAL Weakness Yes  Chills No Vision changes No  ENDOCRINE No unexplained hair loss No heat or cold intolerance    ALLERGY No hives  Sore throat No   RESP Coughing blood no  Shortness of breath YES   NEURO No Headache  Confusion Pain neck No   CARDIAC No Chest pain No Palpitations   GI No Pain abdomen NO   Vomiting NO     PHYSICAL EXAM    HEENT Unremarkable PERRLA atraumatic   RESP Fair air entry EXP prolonged    Harsh breath sound Resp distres mild   CARDIAC S1 S2 No S3     NO JVD    ABDOMEN SOFT BS PRESENT NOT DISTENDED No hepatosplenomegaly PEDAL EDEMA present No calf tenderness  NO rash   GENERAL Not TOXIC looking    PATIENT DATA ASSESSMENT RECOMMENDATIONS  7/11/2018 ADMISSION [facility]                        RESP   7/13/2018 RA 96%   ASSESMENT RECOMMENDATIONS   Clinically stable   Target PO 90-95% adjust O2     PLEURAL EFFSNS   PFA  R PFA 7/13/2018 L 286/404 (.7)  Pr 4.4/6.7 (.65)  G 107 pH 7.7 l 77  CT  7/11/2018 CTAP oc ic   cc diffuse abdominal pain and diarrhea   1) BL pl effsns small on l and mod and loculated on r   6/17/2018 CT Ch   1) Mod bl pl effsns   ASSESSMENT RECOMMENDATIONS   7/14/2018 Pleural effusion is lymph predominanty exudate with high pH This is consistent with malignant effsn Cyto pending   If recurrent may need pleurex or VATS     LUNG NODULES   7/11/2018 CTAP oc ic   1) Basal patchy opacities with more focal enhancing opacity R base   2) Multiple nodules lll upto 9 mm     6/17/2018 CT Ch   1)  Scattered nodular opacities both lungs hawa r lobe measuring upto 1.5 cm   2) Perihilar ggo   ASSESSMENT RECOMMENDATIONS   Likely metastatic cancer based on available history     BRADYCARDIA  7/13/2018 HR 45   7/14/2018 TSH 11.6   Levoxyl 50 (7/14/2018)  ASSESSMENT RECOMMENDATIONS  7/14/2018 Hypothyroid Started on Levoxyl      COLITIS   7/11/2018 CTAP oc ic   cc diffuse abdominal pain and diarrhea   1) Large stool in rectum   2) Mild rectal wall thickening   3) Multiple enlarged rpln  ASSESSMENT RECOMMENDATIONS   Possibly sec to Nivoluman (OPTIVO)   On cip (7/11) flagyl (7/11)       GLOBAL ISSUE/BEST PRACTICE:      PROBLEM: HOB elevation:   y            PROBLEM: Stress ulcer proph:    na                      PROBLEM: VTE prophylaxis:      lvnx 40 (7/11)   PROBLEM: Glycemic control:    na  PROBLEM: Nutrition:    Reg diet (7/11)   PROBLEM: Advanced directive: na     PROBLEM: ALLERGY nka   CONTACT Child Aranza Nelson  self        TIME SPENT Over 25 minutes aggregate care time spent on encounter; activities included   direct patient care, counseling and/or coordinating care reviewing notes, lab data/ imaging , discussion with multidisciplinary team/ patient  /family

## 2018-07-14 NOTE — PROGRESS NOTE ADULT - PROBLEM SELECTOR PLAN 2
most likely due  immunosuppressant , s/p novilmuab on 6/27/18  cdiff negative, giardia negative    started  antibx and probiotics.  GI consult Dr. King noted   s/p large BM ( formed

## 2018-07-14 NOTE — PROGRESS NOTE ADULT - PROBLEM SELECTOR PLAN 6
pt with elevated tsh .   nolberto check free t4. suspect hypothyroid h/o xrt to lung   check echo pt with elevated tsh    nolberto check free t4. suspect hypothyroid h/o xrt to lung, start synthroid 50mcg    check echo especially in light of carboplatin / Paclitaxel

## 2018-07-14 NOTE — PROGRESS NOTE ADULT - SUBJECTIVE AND OBJECTIVE BOX
Patient is a 73y old  Male who presents with a chief complaint of diarrhea (12 Jul 2018 01:49)      OVERNIGHT EVENTS: none    MEDICATIONS  (STANDING):  ciprofloxacin   IVPB      ciprofloxacin   IVPB 400 milliGRAM(s) IV Intermittent every 12 hours  enoxaparin Injectable 40 milliGRAM(s) SubCutaneous every 24 hours  lactobacillus acidophilus 1 Tablet(s) Oral daily  metroNIDAZOLE  IVPB 500 milliGRAM(s) IV Intermittent every 8 hours  metroNIDAZOLE  IVPB        MEDICATIONS  (PRN):  ondansetron Injectable 4 milliGRAM(s) IV Push every 6 hours PRN Nausea and/or Vomiting  oxyCODONE    5 mG/acetaminophen 325 mG 1 Tablet(s) Oral every 6 hours PRN Moderate Pain (4 - 6)      Allergies    No Known Allergies    Intolerances        SUBJECTIVE: in bed in NAD, no acute events overnight     Vital Signs Last 24 Hrs  T(C): 35.8 (14 Jul 2018 05:04), Max: 36 (13 Jul 2018 23:31)  T(F): 96.5 (14 Jul 2018 05:04), Max: 96.8 (13 Jul 2018 23:31)  HR: 46 (14 Jul 2018 05:04) (44 - 81)  BP: 125/61 (14 Jul 2018 05:04) (125/61 - 141/89)  BP(mean): --  RR: 18 (14 Jul 2018 05:04) (16 - 18)  SpO2: 98% (14 Jul 2018 05:04) (96% - 98%)    PHYSICAL EXAM:  GENERAL: NAD, well-groomed ,thin , chronically ill , bi temporal wasting   HEENT atraumatic, Normocephalic  EYES: EOMI, PERRLA, conjunctiva and sclera clear  ENMT: No tonsillar erythema, exudates, or enlargement; Moist mucous membranes, Good dentition, No lesions  NECK: Supple, No JVD, Normal thyroid  CHEST/LUNG: Clear to  auscultation bilaterally; No rales, rhonchi, wheezing, or rubs  bilaterally  HEART: Regular rate and rhythm; No murmurs, rubs, or gallops  ABDOMEN: Soft, Nontender, Nondistended; Bowel sounds present  EXTREMITIES:  2+ Peripheral Pulses, No clubbing, cyanosis, or edema BL LE  SKIN: No rashes or lesions  NERVOUS SYSTEM:  Alert & Oriented X3, Good concentration; Motor Strength 4/5 B/L upper and lower extremities;   DTRs 2+ intact and symmetric, sensation intact BL    LABS:                                          12.6   3.84  )-----------( 179      ( 14 Jul 2018 07:08 )             38.8     07-14    138  |  103  |  21  ----------------------------<  94  3.8   |  28  |  0.92    Ca    8.9      14 Jul 2018 07:08  Phos  2.8     07-14  Mg     2.0     07-14    TPro  6.7  /  Alb  2.8<L>  /  TBili  0.4  /  DBili  x   /  AST  18  /  ALT  13  /  AlkPhos  59  07-14    Cultures;   CAPILLARY BLOOD GLUCOSE        Lipid panel:           RADIOLOGY & ADDITIONAL TESTS:  < from: CT Abdomen and Pelvis w/ Oral Cont and w/ IV Cont (07.11.18 @ 15:38) >    IMPRESSION: Bibasilar patchy opacities as well as focal enhancing opacity   at the right lung base. It addition there are multiple left lower lobe   nodules measuring up to 9 mm concerning for neoplasm. 6 week follow-up   chest CT is recommended.    Bilateral pleural effusions, moderate and loculated on the right, and   small on the left.    Large amount stool within the rectum with mild rectal wall thickening.   Correlate clinically for stercoral colitis.    Enlarged, heterogeneously enhancing prostate gland and multiple enlarged   retroperitoneal nodes.            Imaging Personally Reviewed:  [ x] YES      Consultant(s) Notes Reviewed:  [x ] YES     Care Discussed with [x ] Consultants [X ] Patient [x ] Family  [x ]    [x ]  Other; RN Patient is a 73y old  Male who presents with a chief complaint of diarrhea (12 Jul 2018 01:49)      OVERNIGHT EVENTS: none    MEDICATIONS  (STANDING):  ciprofloxacin   IVPB      ciprofloxacin   IVPB 400 milliGRAM(s) IV Intermittent every 12 hours  enoxaparin Injectable 40 milliGRAM(s) SubCutaneous every 24 hours  lactobacillus acidophilus 1 Tablet(s) Oral daily  metroNIDAZOLE  IVPB 500 milliGRAM(s) IV Intermittent every 8 hours  metroNIDAZOLE  IVPB        MEDICATIONS  (PRN):  ondansetron Injectable 4 milliGRAM(s) IV Push every 6 hours PRN Nausea and/or Vomiting  oxyCODONE    5 mG/acetaminophen 325 mG 1 Tablet(s) Oral every 6 hours PRN Moderate Pain (4 - 6)      Allergies    No Known Allergies    Intolerances        SUBJECTIVE: in bed in NAD, no acute events overnight     Vital Signs Last 24 Hrs  T(C): 35.8 (14 Jul 2018 05:04), Max: 36 (13 Jul 2018 23:31)  T(F): 96.5 (14 Jul 2018 05:04), Max: 96.8 (13 Jul 2018 23:31)  HR: 46 (14 Jul 2018 05:04) (44 - 81)  BP: 125/61 (14 Jul 2018 05:04) (125/61 - 141/89)  BP(mean): --  RR: 18 (14 Jul 2018 05:04) (16 - 18)  SpO2: 98% (14 Jul 2018 05:04) (96% - 98%)    PHYSICAL EXAM:  GENERAL: NAD, well-groomed ,thin , chronically ill , bi temporal wasting   HEENT atraumatic, Normocephalic  EYES: EOMI, PERRLA, conjunctiva and sclera clear  ENMT: No tonsillar erythema, exudates, or enlargement; Moist mucous membranes, Good dentition, No lesions  NECK: Supple, No JVD, Normal thyroid  CHEST/LUNG: Clear to  auscultation bilaterally; No rales, rhonchi, wheezing, or rubs  bilaterally  HEART: Regular rate and rhythm; No murmurs, rubs, or gallops  ABDOMEN: Soft, Nontender, Nondistended; Bowel sounds present, scaphoid abdomen  EXTREMITIES:  2+ Peripheral Pulses, No clubbing, cyanosis, or edema BL LE  SKIN: No rashes or lesions  NERVOUS SYSTEM:  Alert & Oriented X3, Good concentration; Motor Strength 4/5 B/L upper and lower extremities;   DTRs 2+ intact and symmetric, sensation intact BL    LABS:                                          12.6   3.84  )-----------( 179      ( 14 Jul 2018 07:08 )             38.8     07-14    138  |  103  |  21  ----------------------------<  94  3.8   |  28  |  0.92    Ca    8.9      14 Jul 2018 07:08  Phos  2.8     07-14  Mg     2.0     07-14    TPro  6.7  /  Alb  2.8<L>  /  TBili  0.4  /  DBili  x   /  AST  18  /  ALT  13  /  AlkPhos  59  07-14    Cultures;   CAPILLARY BLOOD GLUCOSE        Lipid panel:           RADIOLOGY & ADDITIONAL TESTS:  < from: CT Abdomen and Pelvis w/ Oral Cont and w/ IV Cont (07.11.18 @ 15:38) >    IMPRESSION: Bibasilar patchy opacities as well as focal enhancing opacity   at the right lung base. It addition there are multiple left lower lobe   nodules measuring up to 9 mm concerning for neoplasm. 6 week follow-up   chest CT is recommended.    Bilateral pleural effusions, moderate and loculated on the right, and   small on the left.    Large amount stool within the rectum with mild rectal wall thickening.   Correlate clinically for stercoral colitis.    Enlarged, heterogeneously enhancing prostate gland and multiple enlarged   retroperitoneal nodes.            Imaging Personally Reviewed:  [ x] YES      Consultant(s) Notes Reviewed:  [x ] YES     Care Discussed with [x ] Consultants [X ] Patient [x ] Family  [x ]    [x ]  Other; RN

## 2018-07-15 DIAGNOSIS — E03.9 HYPOTHYROIDISM, UNSPECIFIED: ICD-10-CM

## 2018-07-15 DIAGNOSIS — E03.8 OTHER SPECIFIED HYPOTHYROIDISM: ICD-10-CM

## 2018-07-15 LAB
CULTURE RESULTS: SIGNIFICANT CHANGE UP
SPECIMEN SOURCE: SIGNIFICANT CHANGE UP

## 2018-07-15 PROCEDURE — 93306 TTE W/DOPPLER COMPLETE: CPT | Mod: 26

## 2018-07-15 PROCEDURE — 99233 SBSQ HOSP IP/OBS HIGH 50: CPT

## 2018-07-15 RX ADMIN — Medication 1 TABLET(S): at 11:50

## 2018-07-15 RX ADMIN — ENOXAPARIN SODIUM 40 MILLIGRAM(S): 100 INJECTION SUBCUTANEOUS at 11:50

## 2018-07-15 RX ADMIN — Medication 100 MILLIGRAM(S): at 05:34

## 2018-07-15 RX ADMIN — OXYCODONE AND ACETAMINOPHEN 1 TABLET(S): 5; 325 TABLET ORAL at 02:39

## 2018-07-15 RX ADMIN — Medication 100 MILLIGRAM(S): at 13:31

## 2018-07-15 RX ADMIN — Medication 100 MILLIGRAM(S): at 22:05

## 2018-07-15 RX ADMIN — OXYCODONE AND ACETAMINOPHEN 1 TABLET(S): 5; 325 TABLET ORAL at 23:00

## 2018-07-15 RX ADMIN — Medication 50 MICROGRAM(S): at 05:34

## 2018-07-15 RX ADMIN — OXYCODONE AND ACETAMINOPHEN 1 TABLET(S): 5; 325 TABLET ORAL at 14:02

## 2018-07-15 RX ADMIN — OXYCODONE AND ACETAMINOPHEN 1 TABLET(S): 5; 325 TABLET ORAL at 00:25

## 2018-07-15 RX ADMIN — Medication 200 MILLIGRAM(S): at 05:33

## 2018-07-15 RX ADMIN — Medication 200 MILLIGRAM(S): at 18:24

## 2018-07-15 RX ADMIN — OXYCODONE AND ACETAMINOPHEN 1 TABLET(S): 5; 325 TABLET ORAL at 22:06

## 2018-07-15 RX ADMIN — OXYCODONE AND ACETAMINOPHEN 1 TABLET(S): 5; 325 TABLET ORAL at 13:32

## 2018-07-15 NOTE — PROGRESS NOTE ADULT - SUBJECTIVE AND OBJECTIVE BOX
Assessment:  Bradycardia  Likely secondary to thyroid disease in a setting of CA  Post Chemo, echo with normal EF and only minimal pericardial effussion  Also, pain meds, cause mary ellen, Oxy  will monitor closely, SBP is maintained however,

## 2018-07-15 NOTE — PROGRESS NOTE ADULT - ASSESSMENT
72 y/o male with history with  diarrhea and family telling ems they cannot take care of him    has h/o lung cancer s/p chemo and xrt per daughter started  a new chemo med with dr. kenneth gonzalez  called [phone]

## 2018-07-15 NOTE — PROGRESS NOTE ADULT - PROBLEM SELECTOR PLAN 7
s/p xrt to lung which may have contributed to development   was started on synthroid despite normal free t4    endocrine consult noted reviewed note who agree with synthroid

## 2018-07-15 NOTE — PROGRESS NOTE ADULT - SUBJECTIVE AND OBJECTIVE BOX
Patient is a 73y old  Male who presents with a chief complaint of diarrhea (12 Jul 2018 01:49)      OVERNIGHT EVENTS: none    MEDICATIONS  (STANDING):  ciprofloxacin   IVPB      ciprofloxacin   IVPB 400 milliGRAM(s) IV Intermittent every 12 hours  enoxaparin Injectable 40 milliGRAM(s) SubCutaneous every 24 hours  lactobacillus acidophilus 1 Tablet(s) Oral daily  levothyroxine 50 MICROGram(s) Oral daily  metroNIDAZOLE  IVPB 500 milliGRAM(s) IV Intermittent every 8 hours  metroNIDAZOLE  IVPB        MEDICATIONS  (PRN):  ondansetron Injectable 4 milliGRAM(s) IV Push every 6 hours PRN Nausea and/or Vomiting  oxyCODONE    5 mG/acetaminophen 325 mG 1 Tablet(s) Oral every 6 hours PRN Moderate Pain (4 - 6)    Allergies    No Known Allergies    Intolerances        SUBJECTIVE: in bed in NAD, no acute events overnight     Vital Signs Last 24 Hrs  T(C): 36.1 (15 Jul 2018 05:21), Max: 36.2 (15 Jul 2018 00:23)  T(F): 97 (15 Jul 2018 05:21), Max: 97.2 (15 Jul 2018 00:23)  HR: 48 (15 Jul 2018 05:21) (45 - 48)  BP: 142/58 (15 Jul 2018 05:21) (121/49 - 142/58)  BP(mean): --  RR: 18 (15 Jul 2018 05:21) (18 - 97)  SpO2: 96% (15 Jul 2018 05:21) (96% - 97%)    PHYSICAL EXAM:  GENERAL: NAD, well-groomed ,thin , chronically ill , bi temporal wasting   HEENT atraumatic, Normocephalic  EYES: EOMI, PERRLA, conjunctiva and sclera clear  ENMT: No tonsillar erythema, exudates, or enlargement; Moist mucous membranes, Good dentition, No lesions  NECK: Supple, No JVD, Normal thyroid  CHEST/LUNG: Clear to  auscultation bilaterally; No rales, rhonchi, wheezing, or rubs  bilaterally  HEART: Regular rate and rhythm; No murmurs, rubs, or gallops  ABDOMEN: Soft, Nontender, Nondistended; Bowel sounds present, scaphoid abdomen  EXTREMITIES:  2+ Peripheral Pulses, No clubbing, cyanosis, or edema BL LE  SKIN: No rashes or lesions  NERVOUS SYSTEM:  Alert & Oriented X3, Good concentration; Motor Strength 4/5 B/L upper and lower extremities;   DTRs 2+ intact and symmetric, sensation intact BL    LABS:                                         12.6   3.84  )-----------( 179      ( 14 Jul 2018 07:08 )             38.8   07-14    138  |  103  |  21  ----------------------------<  94  3.8   |  28  |  0.92    Ca    8.9      14 Jul 2018 07:08  Phos  2.8     07-14  Mg     2.0     07-14    TPro  6.7  /  Alb  2.8<L>  /  TBili  0.4  /  DBili  x   /  AST  18  /  ALT  13  /  AlkPhos  59  07-14    Cultures;   CAPILLARY BLOOD GLUCOSE        Lipid panel:           RADIOLOGY & ADDITIONAL TESTS:  < from: CT Abdomen and Pelvis w/ Oral Cont and w/ IV Cont (07.11.18 @ 15:38) >    IMPRESSION: Bibasilar patchy opacities as well as focal enhancing opacity   at the right lung base. It addition there are multiple left lower lobe   nodules measuring up to 9 mm concerning for neoplasm. 6 week follow-up   chest CT is recommended.    Bilateral pleural effusions, moderate and loculated on the right, and   small on the left.    Large amount stool within the rectum with mild rectal wall thickening.   Correlate clinically for stercoral colitis.    Enlarged, heterogeneously enhancing prostate gland and multiple enlarged   retroperitoneal nodes.            Imaging Personally Reviewed:  [ x] YES      Consultant(s) Notes Reviewed:  [x ] YES     Care Discussed with [x ] Consultants [X ] Patient [x ] Family  [x ]    [x ]  Other; RN Patient is a 73y old  Male who presents with a chief complaint of diarrhea (12 Jul 2018 01:49)      OVERNIGHT EVENTS: none, ni bowel moement    MEDICATIONS  (STANDING):  ciprofloxacin   IVPB      ciprofloxacin   IVPB 400 milliGRAM(s) IV Intermittent every 12 hours  enoxaparin Injectable 40 milliGRAM(s) SubCutaneous every 24 hours  lactobacillus acidophilus 1 Tablet(s) Oral daily  levothyroxine 50 MICROGram(s) Oral daily  metroNIDAZOLE  IVPB 500 milliGRAM(s) IV Intermittent every 8 hours  metroNIDAZOLE  IVPB        MEDICATIONS  (PRN):  ondansetron Injectable 4 milliGRAM(s) IV Push every 6 hours PRN Nausea and/or Vomiting  oxyCODONE    5 mG/acetaminophen 325 mG 1 Tablet(s) Oral every 6 hours PRN Moderate Pain (4 - 6)    Allergies    No Known Allergies    Intolerances        SUBJECTIVE: in bed in NAD, no acute events overnight     Vital Signs Last 24 Hrs  T(C): 36.1 (15 Jul 2018 05:21), Max: 36.2 (15 Jul 2018 00:23)  T(F): 97 (15 Jul 2018 05:21), Max: 97.2 (15 Jul 2018 00:23)  HR: 48 (15 Jul 2018 05:21) (45 - 48)  BP: 142/58 (15 Jul 2018 05:21) (121/49 - 142/58)  BP(mean): --  RR: 18 (15 Jul 2018 05:21) (18 - 97)  SpO2: 96% (15 Jul 2018 05:21) (96% - 97%)    PHYSICAL EXAM:  GENERAL: NAD, well-groomed ,thin , chronically ill , bi temporal wasting   HEENT atraumatic, Normocephalic  EYES: EOMI, PERRLA, conjunctiva and sclera clear  ENMT: No tonsillar erythema, exudates, or enlargement; Moist mucous membranes, Good dentition, No lesions  NECK: Supple, No JVD, Normal thyroid  CHEST/LUNG: Clear to  auscultation bilaterally; No rales, rhonchi, wheezing, or rubs  bilaterally  HEART: Regular rate and rhythm; No murmurs, rubs, or gallops  ABDOMEN: Soft, Nontender, Nondistended; Bowel sounds present, scaphoid abdomen  EXTREMITIES:  2+ Peripheral Pulses, No clubbing, cyanosis, or edema BL LE  SKIN: No rashes or lesions  NERVOUS SYSTEM:  Alert & Oriented X3, Good concentration; Motor Strength 4/5 B/L upper and lower extremities;   DTRs 2+ intact and symmetric, sensation intact BL    LABS:                                         12.6   3.84  )-----------( 179      ( 14 Jul 2018 07:08 )             38.8   07-14    138  |  103  |  21  ----------------------------<  94  3.8   |  28  |  0.92    Ca    8.9      14 Jul 2018 07:08  Phos  2.8     07-14  Mg     2.0     07-14    TPro  6.7  /  Alb  2.8<L>  /  TBili  0.4  /  DBili  x   /  AST  18  /  ALT  13  /  AlkPhos  59  07-14    Cultures;   CAPILLARY BLOOD GLUCOSE        Lipid panel:           RADIOLOGY & ADDITIONAL TESTS:  < from: CT Abdomen and Pelvis w/ Oral Cont and w/ IV Cont (07.11.18 @ 15:38) >    IMPRESSION: Bibasilar patchy opacities as well as focal enhancing opacity   at the right lung base. It addition there are multiple left lower lobe   nodules measuring up to 9 mm concerning for neoplasm. 6 week follow-up   chest CT is recommended.    Bilateral pleural effusions, moderate and loculated on the right, and   small on the left.    Large amount stool within the rectum with mild rectal wall thickening.   Correlate clinically for stercoral colitis.    Enlarged, heterogeneously enhancing prostate gland and multiple enlarged   retroperitoneal nodes.            Imaging Personally Reviewed:  [ x] YES      Consultant(s) Notes Reviewed:  [x ] YES     Care Discussed with [x ] Consultants [X ] Patient [x ] Family  [x ]    [x ]  Other; RN

## 2018-07-15 NOTE — PROGRESS NOTE ADULT - PROBLEM SELECTOR PROBLEM 4
Malignant neoplasm of left lung, unspecified part of lung

## 2018-07-15 NOTE — PROGRESS NOTE ADULT - PROBLEM SELECTOR PLAN 6
could be due to hypothyroid  also due to prior chemo/xrt  - check echo especially in light of h/o carboplatin / Paclitaxel   reviewed cardiology note who agree with echo

## 2018-07-15 NOTE — PROGRESS NOTE ADULT - PROBLEM SELECTOR PLAN 4
s/p chemo and xrt 27 sessions  and started on immunosuppressant on 6/27/18 times one dose then developed diarrhea a known side effect .     next dose 7/25/18 with dr. kenneth gonzalez
s/p chemo and xrt 27 sessions  and started on immunosuppressant on 6/27/18 times one dose then developed diarrhea a known side effect .     next dose 7/25/18 with dr. kenneth gonzalez
s/p chemo and xrt per family by dr. uma gonzalez  then per daughter was started on new chemo june 2018 and developed diarrhea. I have called and left message with dr. gonzalez to get more information .
s/p chemo and xrt 27 sessions  and started on immunosuppressant on 6/27/18 times one dose then developed diarrhea a known side effect .     next dose 7/25/18 with dr. kenneth gonzalez

## 2018-07-15 NOTE — PROGRESS NOTE ADULT - SUBJECTIVE AND OBJECTIVE BOX
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  but currently living with dtr  PMH HTN, Metastatic lung cancer diagnosed 9/2017 Raghav Chavez has had ct and rt, lung cancer; malignant pl effusion Rxed with Nivoluman (OPTIVO)  was sent in with diarrhea when he found by family covered in feces and family declaring that they cannot take care of him at home Patient was admitted [facility] 7/11/2018 Patient was started on cipro flagyl (7/11/2018) for colitis and IR thoracentesis is arranged for large R pl effsn tbd 7/13/2018 Pulm consulted 7/12/2018   ONCOLOGIST Dr Elliot Ricci [phone]     VITALS/LABS                           7/15/2018 afeb 85 142/58 18 96% 54  7/14/2018 afeb 72 116/47 16 97% 54   7/14/2018 W 3.8 Hb 12.6 Plt 179 INR 1.20 Na 138 K 3.8 CO2 28 Cr .9     REVIEW OF SYMPTOMS    Able to give ROS  Yes     RELIABLE No   CONSTITUTIONAL Weakness Yes  Chills No Vision changes No  ENDOCRINE No unexplained hair loss No heat or cold intolerance    ALLERGY No hives  Sore throat No   RESP Coughing blood no  Shortness of breath YES   NEURO No Headache  Confusion Pain neck No   CARDIAC No Chest pain No Palpitations   GI No Pain abdomen NO   Vomiting NO     PHYSICAL EXAM    HEENT Unremarkable PERRLA atraumatic   RESP Fair air entry EXP prolonged    Harsh breath sound Resp distres mild   CARDIAC S1 S2 No S3     NO JVD    ABDOMEN SOFT BS PRESENT NOT DISTENDED No hepatosplenomegaly PEDAL EDEMA present No calf tenderness  NO rash   GENERAL Not TOXIC looking    PATIENT DATA ASSESSMENT RECOMMENDATIONS  7/11/2018 ADMISSION [facility]                        RESP   7/13/2018 RA 96%   ASSESMENT RECOMMENDATIONS   Clinically stable   Target PO 90-95% adjust O2     LYMPH PREDOMINANT EXUDATIVE PLEURAL EFFSNS   PFA  R PFA 7/13/2018 L 286/404 (.7)  Pr 4.4/6.7 (.65)  G 107 pH 7.7 l 77  CT  7/11/2018 CTAP oc ic   cc diffuse abdominal pain and diarrhea   1) BL pl effsns small on l and mod and loculated on r   6/17/2018 CT Ch   1) Mod bl pl effsns   ASSESSMENT RECOMMENDATIONS   7/14/2018 Pleural effusion is lymph predominanty exudate with high pH This is consistent with malignant effsn Cyto pending   If recurrent may need pleurex or VATS     LUNG NODULES   7/11/2018 CTAP oc ic   1) Basal patchy opacities with more focal enhancing opacity R base   2) Multiple nodules lll upto 9 mm     6/17/2018 CT Ch   1)  Scattered nodular opacities both lungs hawa r lobe measuring upto 1.5 cm   2) Perihilar ggo   ASSESSMENT RECOMMENDATIONS   Likely metastatic cancer based on available history     BRADYCARDIA  7/13/2018 HR 45   7/14/2018 TSH 11.6   Levoxyl 50 (7/14/2018)  ASSESSMENT RECOMMENDATIONS  7/14/2018 Hypothyroid Started on Levoxyl        COLITIS   7/11/2018 CTAP oc ic   cc diffuse abdominal pain and diarrhea   1) Large stool in rectum   2) Mild rectal wall thickening   3) Multiple enlarged rpln  7/13 Giardia ag n   7/13 stool c n   7/13 C diff n   ASSESSMENT RECOMMENDATIONS   Possibly sec to Nivoluman (OPTIVO)   On cip (7/11) flagyl (7/11)     PROSTATOMEGALY  7/11/2018 CTAP oc ic   Heterogenously enlarged prostate   7/14 PSA 2.5    GLOBAL ISSUE/BEST PRACTICE:      PROBLEM: HOB elevation:   y            PROBLEM: Stress ulcer proph:    na                      PROBLEM: VTE prophylaxis:      lvnx 40 (7/11)   PROBLEM: Glycemic control:    na  PROBLEM: Nutrition:    Reg diet (7/11)   PROBLEM: Advanced directive: na     PROBLEM: ALLERGY nka   CONTACT Child Aranza Nelson  self        TIME SPENT Over 25 minutes aggregate care time spent on encounter; activities included   direct patient care, counseling and/or coordinating care reviewing notes, lab data/ imaging , discussion with multidisciplinary team/ patient  /family

## 2018-07-15 NOTE — PROGRESS NOTE ADULT - PROBLEM SELECTOR PLAN 2
-improving   -most likely due  immunosuppressant , s/p novilmuab on 6/27/18 and this is know side effect of drug    -cdiff negative, giardia negative   - continue   antibx and probiotics.  -GI consult Dr. King noted   -s/p large BM ( formed

## 2018-07-15 NOTE — PROGRESS NOTE ADULT - ATTENDING COMMENTS
per verbal d/w with PT on 7/13/18 reccs are for  FERNANDA    conveyed to  pt should be ready for d.c on Monday/Tuesday   updated daughter Aranza via phone on 7/14/18

## 2018-07-16 LAB — NON-GYNECOLOGICAL CYTOLOGY STUDY: SIGNIFICANT CHANGE UP

## 2018-07-16 PROCEDURE — 99233 SBSQ HOSP IP/OBS HIGH 50: CPT

## 2018-07-16 PROCEDURE — 99497 ADVNCD CARE PLAN 30 MIN: CPT

## 2018-07-16 RX ORDER — LACTULOSE 10 G/15ML
10 SOLUTION ORAL DAILY
Qty: 0 | Refills: 0 | Status: DISCONTINUED | OUTPATIENT
Start: 2018-07-16 | End: 2018-07-17

## 2018-07-16 RX ORDER — ACETAMINOPHEN 500 MG
650 TABLET ORAL EVERY 6 HOURS
Qty: 0 | Refills: 0 | Status: DISCONTINUED | OUTPATIENT
Start: 2018-07-16 | End: 2018-07-17

## 2018-07-16 RX ADMIN — Medication 200 MILLIGRAM(S): at 17:14

## 2018-07-16 RX ADMIN — Medication 100 MILLIGRAM(S): at 05:26

## 2018-07-16 RX ADMIN — Medication 650 MILLIGRAM(S): at 18:14

## 2018-07-16 RX ADMIN — Medication 100 MILLIGRAM(S): at 22:01

## 2018-07-16 RX ADMIN — LACTULOSE 10 GRAM(S): 10 SOLUTION ORAL at 13:39

## 2018-07-16 RX ADMIN — Medication 100 MILLIGRAM(S): at 13:42

## 2018-07-16 RX ADMIN — ENOXAPARIN SODIUM 40 MILLIGRAM(S): 100 INJECTION SUBCUTANEOUS at 06:09

## 2018-07-16 RX ADMIN — Medication 50 MICROGRAM(S): at 05:26

## 2018-07-16 RX ADMIN — Medication 200 MILLIGRAM(S): at 05:26

## 2018-07-16 RX ADMIN — Medication 1 TABLET(S): at 12:10

## 2018-07-16 NOTE — GOALS OF CARE CONVERSATION - PERSONAL ADVANCE DIRECTIVE - WHAT MATTERS MOST
7/16/18 Called Aranza Nelson (daughter) [phone] and discussed goals of care and advance care planning. As per daughter she wanted to proceed with aggressive measures including resuscitation. Daughter was reluctant to discuss further on goals of care. Will follow.

## 2018-07-16 NOTE — PROGRESS NOTE ADULT - ASSESSMENT
1.  Colitis - diarrhea appears resolved.  On Cipro and Flagyl, will discuss with GI about recommended duration.    2.  Hypothyroidism.  Continue Levothyroxine.  Needs to repeat TSH in 4-6 weeks.    3.  Forgetfulness/mild confusion - per discussion with patient's daughter, ongoing for several weeks.  Possible dementia?  MRI brain - daughter states as far as she is aware, no contraindications or implants.  Check syphilis screen and B12.  Hold oxycodone for now.  Use acetaminophen.    4.  Lung cancer - status post thoracentesis.  Need to follow-up with oncologist as outpatient.    I spoke with patient's daughter, Aranza Nelson, and updated her on patient's condition.  Plan is for discharge to [facility] pending MRI brain results.

## 2018-07-16 NOTE — CHART NOTE - NSCHARTNOTEFT_GEN_A_CORE
Pt w/ stomach CA which has spread to Lung; Prognosis poor; Supportive Care. Pt wants Full Intervention.     Factors impacting intake: [ ] none [ ] nausea  [ ] vomiting [ ] diarrhea [ ] constipation  [ ]chewing problems [ ] swallowing issues  [x ] other: Alert/ Forgetful at times.    7/12 - Diet Prescription: Diet, Soft:   Supplement Feeding Modality:  Oral  Ensure Enlive Cans or Servings Per Day:  1       Frequency:  Three Times a day (07-12-18 @ 13:03)    Intake: 50 - 100% meals ; 100% ensure enlive nutritional supplement    Current Weight: Weight (kg): 7/16 - 123.6 (56.1 kg) ; 7/12 - 119.4 (54.2 kg)  % Weight Change - 3.3 % gain - No edema noted    Pertinent Medications: MEDICATIONS  (STANDING):  ciprofloxacin   IVPB      ciprofloxacin   IVPB 400 milliGRAM(s) IV Intermittent every 12 hours  enoxaparin Injectable 40 milliGRAM(s) SubCutaneous every 24 hours  lactobacillus acidophilus 1 Tablet(s) Oral daily  lactulose Syrup 10 Gram(s) Oral daily  levothyroxine 50 MICROGram(s) Oral daily  metroNIDAZOLE  IVPB 500 milliGRAM(s) IV Intermittent every 8 hours  metroNIDAZOLE  IVPB        MEDICATIONS  (PRN):  ondansetron Injectable 4 milliGRAM(s) IV Push every 6 hours PRN Nausea and/or Vomiting  oxyCODONE    5 mG/acetaminophen 325 mG 1 Tablet(s) Oral every 6 hours PRN Moderate Pain (4 - 6)    Pertinent Labs: 07-14 Na138 mmol/L Glu 94 mg/dL K+ 3.8 mmol/L Cr  0.92 mg/dL BUN 21 mg/dL 07-14 Phos 2.8 mg/dL 07-14 Alb 2.8 g/dL<L>     CAPILLARY BLOOD GLUCOS        Skin:  Sacrum Stage ll    Estimated Needs:   [x ] no change since previous assessment - 7/12/18  [ ] recalculated:     Previous Nutrition Diagnosis:   [ ] Inadequate Energy Intake [ ]Inadequate Oral Intake [ ] Excessive Energy Intake   [ ] Underweight [ ] Increased Nutrient Needs [ ] Overweight/Obesity   [ ] Altered GI Function [ ] Unintended Weight Loss [ ] Food & Nutrition Related Knowledge Deficit [ x ] Malnutrition  - Severe in context of Chronic Illness    Nutrition Diagnosis is [x ] ongoing  [ ] resolved [ ] not applicable   Goal -   Met -> Pt will meet estimated nutrition needs; Will consume > 50 % meals/supplements  New Goal -> Pt will consume > 75% meals/Supplements    New Nutrition Diagnosis: [x ] not applicable       Interventions: Continue current diet as Rx'd  Recommend  [ ] Change Diet To:  [ ] Nutrition Supplement  [ ] Nutrition Support  [ ] Other:     Monitoring and Evaluation:   [ ] PO intake [ x ] Tolerance to diet prescription [ x ] weights [ x ] labs[ x ] follow up per protocol  [ ] other:

## 2018-07-16 NOTE — GOALS OF CARE CONVERSATION - PERSONAL ADVANCE DIRECTIVE - TREATMENT GUIDELINE COMMENT
7/16/18 Called Aranza Nelson (daughter) [phone] and discussed goals of care and advance care planning. As per daughter she wanted to proceed with aggressive measures including resuscitation. Daughter was reluctant to discuss further on goals of care. Will follow.

## 2018-07-16 NOTE — PROGRESS NOTE ADULT - SUBJECTIVE AND OBJECTIVE BOX
Pt stable - weak - +Lung CA  no BM yesterday but abd is sift      MEDICATIONS  (STANDING):  ciprofloxacin   IVPB      ciprofloxacin   IVPB 400 milliGRAM(s) IV Intermittent every 12 hours  enoxaparin Injectable 40 milliGRAM(s) SubCutaneous every 24 hours  lactobacillus acidophilus 1 Tablet(s) Oral daily  levothyroxine 50 MICROGram(s) Oral daily  metroNIDAZOLE  IVPB 500 milliGRAM(s) IV Intermittent every 8 hours  metroNIDAZOLE  IVPB        MEDICATIONS  (PRN):  ondansetron Injectable 4 milliGRAM(s) IV Push every 6 hours PRN Nausea and/or Vomiting  oxyCODONE    5 mG/acetaminophen 325 mG 1 Tablet(s) Oral every 6 hours PRN Moderate Pain (4 - 6)      Allergies    No Known Allergies    Intolerances        Vital Signs Last 24 Hrs  T(C): 35.8 (16 Jul 2018 06:15), Max: 36.4 (15 Jul 2018 12:00)  T(F): 96.4 (16 Jul 2018 06:15), Max: 97.5 (15 Jul 2018 12:00)  HR: 48 (16 Jul 2018 06:15) (43 - 85)  BP: 147/42 (16 Jul 2018 06:15) (87/74 - 147/42)  BP(mean): --  RR: 18 (16 Jul 2018 06:15) (16 - 18)  SpO2: 97% (16 Jul 2018 06:15) (94% - 98%)    PHYSICAL EXAM:  General: NAD.  CVS: S1, S2  Chest: air entry bilaterally present  Abd: BS present, soft, non-tender        supportive care  lactulose

## 2018-07-16 NOTE — CONSULT NOTE ADULT - ASSESSMENT
Bhavani Stringer R [facility]  [id number]   ALLERGY nka   CONTACT Child Aranza Nelson  self    REASON FOR VISIT   Initial evaluation/Pulmonary consultation requested 7/12/2018  by Dr Josee Small from Dr Dunaway   Patient examined chart reviewed  HOSPITAL ADMISSION [facility] 7/11/2018     PATIENT CAME  FROM (if information available)      PATIENT DESCRIPTION 7/11/2018 ADMISSION [facility]   73 m PMH HTN, stomach cancer, lung cancer; malignant pl effusion was sent in with diarrhea found by family covered in feces and family declaring that they cannot takje care of him at home Patient was admitted [facility] 7/11/2018 Patient was started on cipro flagyl (7/11/2018) for colitis and IR thoracentesis is arranged for large R pl effsn tbd 7/13/2018 Pulm consulted 7/12/2018     VITALS/LABS                           7/12/2018 afeb 48 114/54 17 96%   7/12/2018 W 5.3 Hb 11.3 Plt 182 Na 141 K 3.8 CO2 27 Cr .91     REVIEW OF SYMPTOMS    Able to give ROS  Yes     RELIABLE No   CONSTITUTIONAL Weakness Yes  Chills No Vision changes No  ENDOCRINE No unexplained hair loss No heat or cold intolerance    ALLERGY No hives  Sore throat No   RESP Coughing blood no  Shortness of breath YES   NEURO No Headache  Confusion Pain neck No   CARDIAC No Chest pain No Palpitations   GI No Pain abdomen NO   Vomiting NO     PHYSICAL EXAM    HEENT Unremarkable PERRLA atraumatic   RESP Fair air entry EXP prolonged    Harsh breath sound Resp distres mild   CARDIAC S1 S2 No S3     NO JVD    ABDOMEN SOFT BS PRESENT NOT DISTENDED No hepatosplenomegaly PEDAL EDEMA present No calf tenderness  NO rash   GENERAL Not TOXIC looking    PATIENT DATA ASSESSMENT RECOMMENDATIONS  7/11/2018 ADMISSION [facility]                        RESP   ASSESMENT RECOMMENDATIONS   Target PO 90-95% adjust O2     PLEURAL EFFSNS   7/11/2018 CTAP oc ic   cc diffuse abdominal pain and diarrhea   1) BL pl effsns small on l and mod and loculated on r   6/17/2018 CT Ch   1) Mod bl pl effsns   ASSESSMENT RECOMMENDATIONS   7/12/2018 DW IR PA Thoracentesis planned for 7/13/2018     LUNG NODULES   7/11/2018 CTAP oc ic   1) Basal patchy opacities with more focal enhancing opacity R base   2) Multiple nodules lll upto 9 mm     6/17/2018 CT Ch   1)  Scattered nodular opacities both lungs hawa r lobe measuring upto 1.5 cm   2) Perihilar ggo   ASSESSMENT RECOMMENDATIONS   Likely metastatic cancer based on available history     COLITIS   7/11/2018 CTAP oc ic   cc diffuse abdominal pain and diarrhea   1) Large stool in rectum   2) Mild rectal wall thickening   3) Multiple enlarged rpln  ASSESSMENT RECOMMENDATIONS   On cip (7/11) flagyl (7/11)   LNE ? colon ca May need GI eval     PROSTATOMEGALY  7/11/2018 CTAP oc ic   Heterogenously enlarged prostate     GLOBAL ISSUE/BEST PRACTICE:      PROBLEM: HOB elevation:   y            PROBLEM: Stress ulcer proph:    na                      PROBLEM: VTE prophylaxis:      lvnx 40 (7/11)   PROBLEM: Glycemic control:    na  PROBLEM: Nutrition:    Reg diet (7/11)   PROBLEM: Advanced directive: na     PROBLEM: Allergies:  na      TIME SPENT Over 55 minutes aggregate care time spent on encounter; activities included   direct patient care, counseling and/or coordinating care reviewing notes, lab data/ imaging , discussion with multidisciplinary team/ patient  /family
Family meeting on: DATE: 7/16/18 Called Aranza Nelson (daughter) [phone] and discussed goals of care and advance care planning. As per daughter she wanted to proceed with aggressive measures including resuscitation. Daughter was reluctant to discuss further on goals of care. Will follow.   RECOMMENDATIONS: CONSIDER DNR/ DNI, COMFORT CARE, PAIN AND SYMPTOM MANAGEMENT WITH HOSPICE CARE.
Hypothyroidism

## 2018-07-16 NOTE — GOALS OF CARE CONVERSATION - PERSONAL ADVANCE DIRECTIVE - CONVERSATION DETAILS
72 yo M with diarrhea.  Pt. sent in by family because he was covered in feces.  Pt. admits to diarrhea, abd pain, no other complaints.  Pt. currently getting chemo for lung Ca.  Pt's family told EMS, "we can't clean him all the time,"  ROS: negative for fever, cough, headache, chest pain, shortness of breath, abd pain, nausea, vomiting, diarrhea, rash, paresthesia, and weakness--all other systems reviewed are negative. PMH: HTN, stomach cancer, ?lung cancer; Meds: See EMR; SH: Denies smoking/drinking/drug use.    7/16/18 Called Aranza Nelson (daughter) [phone] and discussed goals of care and advance care planning. As per daughter she wanted to proceed with aggressive measures including resuscitation. Daughter was reluctant to discuss further on goals of care. Will follow.   RECOMMENDATIONS: CONSIDER DNR/ DNI, COMFORT CARE, PAIN AND SYMPTOM MANAGEMENT WITH HOSPICE CARE.

## 2018-07-16 NOTE — CONSULT NOTE ADULT - SUBJECTIVE AND OBJECTIVE BOX
HPI:   HPI Objective Statement: 72 yo M with diarrhea.  Pt. sent in by family because he was covered in feces.  Pt. admits to diarrhea, abd pain, no other complaints.  Pt. currently getting chemo for lung Ca.  Pt's family told EMS, "we can't clean him all the time,"  ROS: negative for fever, cough, headache, chest pain, shortness of breath, abd pain, nausea, vomiting, diarrhea, rash, paresthesia, and weakness--all other systems reviewed are negative. PMH: HTN, stomach cancer, ?lung cancer; Meds: See EMR; SH: Denies smoking/drinking/drug use.    PERTINENT PMH REVIEWED:  [X ] YES [ ] NO           SOCIAL HISTORY:  Significant other/partner:  [ ] YES  [ ] NO            Children:  [X ] YES  [ ] NO                   Adventist/Spirituality:  Substance hx:  [ ] YES   [ ] NO           Tobacco hx:  [ ] YES  [ ] NO             Alcohol hx: [ ] YES  [ ] NO        Home Opioid hx:  [ ] YES  [ ] NO   Living Situation: [X ] Home  [ ] Long term care  [ ] Rehab    FAMILY HISTORY:    [ ] Family history non contributory     BASELINE ADLs (prior to admission):  Independent [ ] moderately [ ] fully   Dependent   [ ] moderately [X ] fully    Code Status:                      MOLST  [ ] YES [X ] NO    Living Will  [ ] YES [X ] NO    Health Care Proxy [ ] YES  [X ] NO      [ ] Surrogate  [ ] HCP  [ ] Guardian:  Aranza Nelson                                                                 Phone#:    Allergies  No Known Allergies    Intolerances    MEDICATIONS  (STANDING):  ciprofloxacin   IVPB      ciprofloxacin   IVPB 400 milliGRAM(s) IV Intermittent every 12 hours  enoxaparin Injectable 40 milliGRAM(s) SubCutaneous every 24 hours  lactobacillus acidophilus 1 Tablet(s) Oral daily  lactulose Syrup 10 Gram(s) Oral daily  levothyroxine 50 MICROGram(s) Oral daily  metroNIDAZOLE  IVPB 500 milliGRAM(s) IV Intermittent every 8 hours  metroNIDAZOLE  IVPB        MEDICATIONS  (PRN):  ondansetron Injectable 4 milliGRAM(s) IV Push every 6 hours PRN Nausea and/or Vomiting  oxyCODONE    5 mG/acetaminophen 325 mG 1 Tablet(s) Oral every 6 hours PRN Moderate Pain (4 - 6)    PRESENT SYMPTOMS:  Source: [ ] Patient   [ x] Family   [ ] Team     Pain: [ ] YES [ ] NO  Onset:                    Location:                          Duration:                 Character:            Aggravating factors:                        Relieving factors:    Radiation:              Timing:                             Severity:      Dyspnea: [x ] YES [ ] NO - Mild [ ]  Moderate [x ]  Severe [ ]    Anxiety: [x ] YES [ ] NO  Fatigue: [x ] YES [ ] NO   Nausea: [ ] YES [x ] NO  Loss of appetite: [x ] YES [ ] NO   Constipation: [ ] YES [x ] NO     Other Symptoms:  [ ] All other review of systems negative   [x ] Unable to obtain due to poor mentation     Does patient meet criteria for Severe Protein Calorie Malnutrition?  Yes [x ]  No [ ]  PPSV 30% or below [x ]  Anasarca [ x]  Albumin < 2 [x ] Catabolic State [ ] Poor nutritional intake [x ] Significant weight loss [ x]      Palliative Performance Status Version 2: 20 %  ECOG -      Vital Signs Last 24 Hrs  T(C): 36.2 (16 Jul 2018 12:28), Max: 36.4 (16 Jul 2018 00:01)  T(F): 97.2 (16 Jul 2018 12:28), Max: 97.5 (16 Jul 2018 00:01)  HR: 51 (16 Jul 2018 12:28) (43 - 78)  BP: 131/48 (16 Jul 2018 12:28) (122/58 - 147/42)  BP(mean): --  RR: 16 (16 Jul 2018 12:28) (16 - 18)  SpO2: 99% (16 Jul 2018 12:28) (94% - 99%)    Physical Exam:    General: [x ] Alert,  A&O x 2    [x ] lethargic   [ ] Agitated   [x ] Cachexia   HEENT: [ ] Normal   [x ] Dry mouth   [ ] ET Tube    [ ] Trach   Lungs: [ ] Clear [x ] Rhonchi  [ ] Crackles [ ] Wheezing [ ] Tachypnea  [ ] Audible excessive secretions    Cardiovascular:  [ ] Regular rate and rhythm  [ ] Irregular [ ] Tachycardia   [x ] Bradycardia   Abdomen: [ x] Soft  [ ] Distended  [x ] +BS  [x ] Non tender [ ] Tender  [ ]PEG   [ ] NGT   Last BM:     Genitourinary: [x ] Normal [ ] Incontinent   [ ] Oliguria/Anuria   [ ] Moreno  Musculoskeletal:  [ ] Normal   [ ] Generalized weakness  [x ] Bedbound  [ ] Edema   Neurological: [ ] No focal deficits  [x ] Cognitive impairment     Skin: [x ] Normal   [ ] Pressure ulcers     LABS:  I&O's Summary    15 Jul 2018 07:01  -  16 Jul 2018 07:00  --------------------------------------------------------  IN: 1140 mL / OUT: 900 mL / NET: 240 mL    16 Jul 2018 07:01  -  16 Jul 2018 15:03  --------------------------------------------------------  IN: 360 mL / OUT: 400 mL / NET: -40 mL    RADIOLOGY & ADDITIONAL STUDIES:    Referrals:  Hospice [ ]   [facility] [X ]    [facility] [ ]   Social Work [ X]   Case Management [ ]   Holistic Therapy [ ]
HPI:  HPI:  · HPI Objective Statement: 74 yo M with diarrhea.  Pt. sent in by family because he was covered in feces.  Pt. admits to diarrhea, abd pain, no other complaints.  Pt. currently getting chemo for lung Ca.  Pt's family told EMS, "we can't clean him all the time,"    	ROS: negative for fever, cough, headache, chest pain, shortness of breath, abd pain, nausea, vomiting, diarrhea, rash, paresthesia, and weakness--all other systems reviewed are negative.   PMH: HTN, stomach cancer, ?lung cancer; Meds: See EMR; SH: Denies smoking/drinking/drug use (2018 17:42)      Chief Complaint:  Patient is a 73y old  Male who presents with a chief complaint of diarrhea (2018 01:49)      Review of Systems:  see above           Social History/Family History  SOCHX:   tobacco,  -  alcohol    FMHX: FA/MO  - contributory       Discussed with:  PMD, Family    Physical Exam:    Vital Signs:  Vital Signs Last 24 Hrs  T(C): 36.1 (2018 18:14), Max: 36.5 (2018 11:46)  T(F): 97 (2018 18:14), Max: 97.7 (2018 11:46)  HR: 45 (2018 18:14) (44 - 72)  BP: 121/49 (2018 18:14) (116/47 - 138/54)  BP(mean): --  RR: 97 (2018 18:14) (16 - 97)  SpO2: 97% (2018 18:14) (97% - 98%)  Daily     Daily Weight in k.5 (2018 05:04)  I&O's Summary    2018 07:01  -  2018 07:00  --------------------------------------------------------  IN: 1060 mL / OUT: 1060 mL / NET: 0 mL    2018 07:01  -  2018 21:00  --------------------------------------------------------  IN: 1000 mL / OUT: 300 mL / NET: 700 mL          Chest:  Full & symmetric excursion, no increased effort, breath sounds clear  Cardiovascular:  Regular rhythm, S1, S2, no murmur/rub/S3/S4, no carotid/femoral/abdominal bruit, radial/pedal pulses 2+, no edema  Abdomen:  Soft, non-tender, non-distended, normoactive bowel sounds, no HSM    Laboratory:                          12.6   3.84  )-----------( 179      ( 2018 07:08 )             38.8     07-14    138  |  103  |  21  ----------------------------<  94  3.8   |  28  |  0.92    Ca    8.9      2018 07:08  Phos  2.8     07-14  Mg     2.0     07-14    TPro  6.7  /  Alb  2.8<L>  /  TBili  0.4  /  DBili  x   /  AST  18  /  ALT  13  /  AlkPhos  59  07-14      CARDIAC MARKERS ( 2018 07:57 )  x     / x     / 32 U/L / x     / x          CAPILLARY BLOOD GLUCOSE        LIVER FUNCTIONS - ( 2018 07:08 )  Alb: 2.8 g/dL / Pro: 6.7 gm/dL / ALK PHOS: 59 U/L / ALT: 13 U/L / AST: 18 U/L / GGT: x           PT/INR - ( 2018 07:08 )   PT: 13.1 sec;   INR: 1.20 ratio               Assessment:  Bradycardia  Likely secondary to thyroid disease in a setting of CA  Post Chemo, check TTE  Also, pain meds, cause mary ellen, Oxy  will monitor closely, SBP is maintained however,
Patient is a 73y old  Male who presents with a chief complaint of diarrhea (12 Jul 2018 01:49)      Reason For Consult:  Hypothyroidism     HPI:  · HPI Objective Statement: 72 yo M with diarrhea.  Pt. sent in by family because he was covered in feces.  Pt. admits to diarrhea, abd pain, no other complaints.  Pt. currently getting chemo for lung Ca.  Pt's family told EMS, "we can't clean him all the time,"    	ROS: negative for fever, cough, headache, chest pain, shortness of breath, abd pain, nausea, vomiting, diarrhea, rash, paresthesia, and weakness--all other systems reviewed are negative.   PMH: HTN, stomach cancer, ?lung cancer; Meds: See EMR; SH: Denies smoking/drinking/drug use (11 Jul 2018 17:42)  TSH of 11.6 and started on levothyroxine 50 mcg - correctly so   no known prior history of thyroid disease        PAST MEDICAL & SURGICAL HISTORY:  Stomach cancer  Hypertension  No significant past surgical history      FAMILY HISTORY:        Social History:    MEDICATIONS  (STANDING):  ciprofloxacin   IVPB      ciprofloxacin   IVPB 400 milliGRAM(s) IV Intermittent every 12 hours  enoxaparin Injectable 40 milliGRAM(s) SubCutaneous every 24 hours  lactobacillus acidophilus 1 Tablet(s) Oral daily  levothyroxine 50 MICROGram(s) Oral daily  metroNIDAZOLE  IVPB 500 milliGRAM(s) IV Intermittent every 8 hours  metroNIDAZOLE  IVPB        MEDICATIONS  (PRN):  ondansetron Injectable 4 milliGRAM(s) IV Push every 6 hours PRN Nausea and/or Vomiting  oxyCODONE    5 mG/acetaminophen 325 mG 1 Tablet(s) Oral every 6 hours PRN Moderate Pain (4 - 6)      REVIEW OF SYSTEMS:  CONSTITUTIONAL:  weak  HEENT:  Eyes:  No diplopia or blurred vision. ENT:  No earache, sore throat or runny nose.  CARDIOVASCULAR:  No pressure, squeezing, strangling, tightness, heaviness or aching about the chest, neck, axilla or epigastrium.  RESPIRATORY:  No cough, shortness of breath, PND or orthopnea.  GASTROINTESTINAL:  No nausea, vomiting or diarrhea.  GENITOURINARY:  No dysuria, frequency or urgency. No Blood in urine  MUSCULOSKELETAL:  no joint aches, no muscle pain, myalgia  ENDOCRINE:  No heat or cold intolerance, polyuria or polydipsia. abnormal weight gain or loss, oral thrush  HEMATOLOGICAL:  No easy bruising or bleeding.     T(C): 36.1 (07-15-18 @ 05:21), Max: 36.2 (07-15-18 @ 00:23)  HR: 48 (07-15-18 @ 05:21) (45 - 48)  BP: 142/58 (07-15-18 @ 05:21) (121/49 - 142/58)  RR: 18 (07-15-18 @ 05:21) (18 - 97)  SpO2: 96% (07-15-18 @ 05:21) (96% - 97%)  Wt(kg): --    PHYSICAL EXAM: cachectic  GENERAL: NAD, poorly nourished   HEAD:  Atraumatic, Normocephalic  EYES: EOMI, PERRLA, conjunctiva and sclera clear  ENMT: No tonsillar erythema, exudates, or enlargement; Moist mucous membranes, Good dentition, No lesions  NECK: Supple, No JVD, Normal thyroid  NERVOUS SYSTEM:  Alert & Oriented X3, Good concentration; Motor Strength 5/5 B/L upper and lower extremities; DTRs 2+ intact and symmetric  CHEST/LUNG: Clear to percussion bilaterally; No rales, rhonchi, wheezing, or rubs  HEART: Regular rate and rhythm; No murmurs, rubs, or gallops  ABDOMEN: Soft, Nontender, Nondistended; Bowel sounds present  EXTREMITIES:  2+ Peripheral Pulses, No clubbing, cyanosis, or edema  LYMPH: No lymphadenopathy noted  SKIN: No rashes or lesions    CAPILLARY BLOOD GLUCOSE                                12.6   3.84  )-----------( 179      ( 14 Jul 2018 07:08 )             38.8       CMP:  07-14 @ 07:08  SGPT 13  Albumin 2.8   Alk Phos 59   Anion Gap 7   SGOT 18   Total Bili 0.4   BUN 21   Calcium Total 8.9   CO2 28   Chloride 103   Creatinine 0.92   eGFR if AA 94   eGFR if non AA 81   Glucose 94   Potassium 3.8   Protein 6.7   Sodium 138      Thyroid Function Tests:  07-14 @ 15:51 TSH -- FreeT4 1.3 T3 -- Anti TPO -- Anti Thyroglobulin Ab -- TSI --  07-14 @ 07:08 TSH 11.600 FreeT4 -- T3 -- Anti TPO -- Anti Thyroglobulin Ab -- TSI --      Diabetes Tests:     Parathyroids:     Adrenals:       Radiology:
full consult dictated    Plan: pt is s/p chemo and RT for lung CA.  He would prefer to hold off on enemas.  Will order PO Mag Citrate and observe
pl see assessment

## 2018-07-16 NOTE — PROGRESS NOTE ADULT - SUBJECTIVE AND OBJECTIVE BOX
Patient is a 73y old  Male who presents with a chief complaint of diarrhea (12 Jul 2018 01:49)      INTERVAL HPI/OVERNIGHT EVENTS:    Colitis - denied diarrhea.  No bowel movements recorded for days.  Started on Lactulose by Dr. King today.    Hypothyroidism - on levothyroxine 50 micrograms daily.  Denied temp. intolerance.      Pleural effusion - denied cough or SOB.    MEDICATIONS  (STANDING):  ciprofloxacin   IVPB      ciprofloxacin   IVPB 400 milliGRAM(s) IV Intermittent every 12 hours  enoxaparin Injectable 40 milliGRAM(s) SubCutaneous every 24 hours  lactobacillus acidophilus 1 Tablet(s) Oral daily  lactulose Syrup 10 Gram(s) Oral daily  levothyroxine 50 MICROGram(s) Oral daily  metroNIDAZOLE  IVPB 500 milliGRAM(s) IV Intermittent every 8 hours  metroNIDAZOLE  IVPB        MEDICATIONS  (PRN):  ondansetron Injectable 4 milliGRAM(s) IV Push every 6 hours PRN Nausea and/or Vomiting  oxyCODONE    5 mG/acetaminophen 325 mG 1 Tablet(s) Oral every 6 hours PRN Moderate Pain (4 - 6)      Allergies    No Known Allergies    Intolerances        REVIEW OF SYSTEMS:    Vital Signs Last 24 Hrs  T(C): 36.2 (16 Jul 2018 12:28), Max: 36.4 (16 Jul 2018 00:01)  T(F): 97.2 (16 Jul 2018 12:28), Max: 97.5 (16 Jul 2018 00:01)  HR: 51 (16 Jul 2018 12:28) (43 - 78)  BP: 131/48 (16 Jul 2018 12:28) (122/58 - 147/42)  BP(mean): --  RR: 16 (16 Jul 2018 12:28) (16 - 18)  SpO2: 99% (16 Jul 2018 12:28) (94% - 99%)    PHYSICAL EXAM:  GENERAL: NAD, thin-appearing male.  HEAD:  Atraumatic, Normocephalic  EYES: EOMI, PERRLA, conjunctiva and sclera clear  CHEST/LUNG:  Rhonchi right base, respiratory effort does not appear labored.  HEART: Regular rate and rhythm; No murmurs, rubs, or gallops  ABDOMEN: Soft, Nontender, Nondistended; Bowel sounds present  EXTREMITIES:  No clubbing, cyanosis, or edema  SKIN: No rashes or lesions.  Decreased turgor.  NERVOUS SYSTEM:  Alert & Oriented X2, Motor Strength 4/5 B/L upper and lower extremities; generalized, decreased muscle mass.  LABS:              CAPILLARY BLOOD GLUCOSE          Culture - Acid Fast - Stool w/Smear (collected 13 Jul 2018 14:02)  Source: .Stool Stool    Culture - Body Fluid with Gram Stain (collected 13 Jul 2018 13:53)  Source: .Body Fluid Pleural Fluid  Gram Stain (prelim) (14 Jul 2018 08:33):    by cytocentrifuge    polymorphonuclear leukocytes per low power field    No organisms seen per oil power field  Preliminary Report (14 Jul 2018 08:33):    No growth    Culture - Stool (collected 13 Jul 2018 11:28)  Source: .Stool Feces  Final Report (15 Jul 2018 08:36):    No enteric pathogens isolated.    (Stool culture examined for Salmonella,    Shigella, Campylobacter, Aeromonas, Plesiomonas,    Vibrio, E.coli O157 and Yersinia)      RADIOLOGY & ADDITIONAL TESTS:    Imaging Personally Reviewed:  [ ] YES  [ ] NO    Consultant(s) Notes Reviewed:  [X ] YES  [ ] NO    Care Discussed with Consultants/Other Providers [ ] YES  [ ] NO

## 2018-07-16 NOTE — PROGRESS NOTE ADULT - SUBJECTIVE AND OBJECTIVE BOX
Patient is a 73y old  Male who presents with a chief complaint of diarrhea (12 Jul 2018 01:49)      Interval History: on 50 mcg levothyroxine   free T4 is 1.3     MEDICATIONS  (STANDING):  ciprofloxacin   IVPB      ciprofloxacin   IVPB 400 milliGRAM(s) IV Intermittent every 12 hours  enoxaparin Injectable 40 milliGRAM(s) SubCutaneous every 24 hours  lactobacillus acidophilus 1 Tablet(s) Oral daily  lactulose Syrup 10 Gram(s) Oral daily  levothyroxine 50 MICROGram(s) Oral daily  metroNIDAZOLE  IVPB 500 milliGRAM(s) IV Intermittent every 8 hours  metroNIDAZOLE  IVPB        MEDICATIONS  (PRN):  acetaminophen   Tablet 650 milliGRAM(s) Oral every 6 hours PRN For Temp greater than 38 C (100.4 F)  ondansetron Injectable 4 milliGRAM(s) IV Push every 6 hours PRN Nausea and/or Vomiting      Allergies    No Known Allergies    Intolerances        REVIEW OF SYSTEMS:  CONSTITUTIONAL: lethargic   poorly nourished  and poorly responsive     Vital Signs Last 24 Hrs  T(C): 36.8 (16 Jul 2018 17:00), Max: 36.8 (16 Jul 2018 17:00)  T(F): 98.2 (16 Jul 2018 17:00), Max: 98.2 (16 Jul 2018 17:00)  HR: 46 (16 Jul 2018 17:00) (46 - 78)  BP: 152/73 (16 Jul 2018 17:00) (122/58 - 152/73)  BP(mean): --  RR: 16 (16 Jul 2018 17:00) (16 - 18)  SpO2: 96% (16 Jul 2018 17:00) (94% - 99%)    PHYSICAL EXAM:  GENERAL: cachectic  HEAD:  Atraumatic, Normocephalic  EYES: EOMI, PERRLA, conjunctiva and sclera clear  NECK: Supple, No JVD, Normal thyroid  NERVOUS SYSTEM:  Alert & Oriented X3,   CHEST/LUNG: Clear to percussion bilaterally; No rales, rhonchi, wheezing, or rubs  HEART: Regular rate and rhythm; No murmurs, rubs, or gallops  ABDOMEN: Soft, Nontender, Nondistended; Bowel sounds present  EXTREMITIES:  2+ Peripheral Pulses, No clubbing, cyanosis, or edema  SKIN: No rashes or lesions    LABS:        CAPILLARY BLOOD GLUCOSE        Lipid panel:           Thyroid:  Diabetes Tests:  Parathyroid Panel:  Adrenals:  RADIOLOGY & ADDITIONAL TESTS:    Imaging Personally Reviewed:  [ ] YES  [ ] NO    Consultant(s) Notes Reviewed:  [ ] YES  [ ] NO    Care Discussed with Consultants/Other Providers [ ] YES  [ ] NO

## 2018-07-16 NOTE — PROGRESS NOTE ADULT - PROBLEM SELECTOR PLAN 1
continue with same   Check thyroid function tests in a few days especially free T4   correct TSH to 2.0   sick thyroid syndrome may be present   The recovery of TSH may lag behind time wise compared to  thyroid hormones like T4 and T3

## 2018-07-16 NOTE — PROGRESS NOTE ADULT - SUBJECTIVE AND OBJECTIVE BOX
Assessment:  Bradycardia  Likely secondary to thyroid disease in a setting of CA  Post Chemo, echo with normal EF and only minimal pericardial effussion  Also, pain meds, cause mary ellen, Oxy  will monitor closely, SBP is maintained however,   Plan for FERNANDA after MRI

## 2018-07-16 NOTE — PROGRESS NOTE ADULT - SUBJECTIVE AND OBJECTIVE BOX
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  but currently living with dtr  PMH HTN, Metastatic lung cancer diagnosed 9/2017 Raghav Chavez has had ct and rt, lung cancer; malignant pl effusion Rxed with Nivoluman (OPTIVO)  was sent in with diarrhea when he found by family covered in feces and family declaring that they cannot take care of him at home Patient was admitted [facility] 7/11/2018 Patient was started on cipro flagyl (7/11/2018) for colitis and IR thoracentesis is arranged for large R pl effsn tbd 7/13/2018 Pulm consulted 7/12/2018   ONCOLOGIST Dr Elliot Ricci [phone]     VITALS/LABS                           7/16/2018 [id number] 48 147/42 18 97% 96   7/14/2018 W 3.8 Hb 12.6 Plt 179 INR 1.20 Na 138 K 3.8 CO2 28 Cr .9     REVIEW OF SYMPTOMS    Able to give ROS  Yes     RELIABLE No   CONSTITUTIONAL Weakness Yes  Chills No Vision changes No  ENDOCRINE No unexplained hair loss No heat or cold intolerance    ALLERGY No hives  Sore throat No   RESP Coughing blood no  Shortness of breath YES   NEURO No Headache  Confusion Pain neck No   CARDIAC No Chest pain No Palpitations   GI No Pain abdomen NO   Vomiting NO     PHYSICAL EXAM    HEENT Unremarkable PERRLA atraumatic   RESP Fair air entry EXP prolonged    Harsh breath sound Resp distres mild   CARDIAC S1 S2 No S3     NO JVD    ABDOMEN SOFT BS PRESENT NOT DISTENDED No hepatosplenomegaly PEDAL EDEMA present No calf tenderness  NO rash   GENERAL Not TOXIC looking    PATIENT DATA ASSESSMENT RECOMMENDATIONS  7/11/2018 ADMISSION [facility]                        RESP   7/13/2018 RA 96%   ASSESMENT RECOMMENDATIONS   Clinically stable   Target PO 90-95% adjust O2     LYMPH PREDOMINANT EXUDATIVE PLEURAL EFFSNS   PFA  R PFA 7/13/2018 L 286/404 (.7)  Pr 4.4/6.7 (.65)  G 107 pH 7.7 l 77  CT  7/11/2018 CTAP oc ic   cc diffuse abdominal pain and diarrhea   1) BL pl effsns small on l and mod and loculated on r   6/17/2018 CT Ch   1) Mod bl pl effsns   ASSESSMENT RECOMMENDATIONS   7/14/2018 Pleural effusion is lymph predominanty exudate with high pH This is consistent with malignant effsn Cyto pending   If recurrent may need pleurex or VATS     LUNG NODULES   7/11/2018 CTAP oc ic   1) Basal patchy opacities with more focal enhancing opacity R base   2) Multiple nodules lll upto 9 mm     6/17/2018 CT Ch   1)  Scattered nodular opacities both lungs hawa r lobe measuring upto 1.5 cm   2) Perihilar ggo   ASSESSMENT RECOMMENDATIONS   Likely metastatic cancer based on available history     BRADYCARDIA  7/13/2018 HR 45   7/14/2018 TSH 11.6   ECHO 7/15 ef 55%   Levoxyl 50 (7/14/2018)  ASSESSMENT RECOMMENDATIONS  7/14/2018 Hypothyroid Started on Levoxyl      COLITIS   7/11/2018 CTAP oc ic   cc diffuse abdominal pain and diarrhea   1) Large stool in rectum   2) Mild rectal wall thickening   3) Multiple enlarged rpln  7/13 Giardia ag n   7/13 stool c n   7/13 C diff n   ASSESSMENT RECOMMENDATIONS   Possibly sec to Nivoluman (OPTIVO)   On cip (7/11) flagyl (7/11)     GLOBAL ISSUE/BEST PRACTICE:      PROBLEM: HOB elevation:   y            PROBLEM: Stress ulcer proph:    na                      PROBLEM: VTE prophylaxis:      lvnx 40 (7/11)   PROBLEM: Glycemic control:    na  PROBLEM: Nutrition:    Reg diet (7/11)   PROBLEM: Advanced directive: na     PROBLEM: ALLERGY nka   CONTACT Child Aranza Nelson  self        TIME SPENT Over 25 minutes aggregate care time spent on encounter; activities included   direct patient care, counseling and/or coordinating care reviewing notes, lab data/ imaging , discussion with multidisciplinary team/ patient  /family

## 2018-07-17 ENCOUNTER — TRANSCRIPTION ENCOUNTER (OUTPATIENT)
Age: 73
End: 2018-07-17

## 2018-07-17 VITALS
SYSTOLIC BLOOD PRESSURE: 143 MMHG | DIASTOLIC BLOOD PRESSURE: 78 MMHG | OXYGEN SATURATION: 95 % | HEART RATE: 97 BPM | RESPIRATION RATE: 18 BRPM | TEMPERATURE: 96 F

## 2018-07-17 LAB
ALBUMIN SERPL ELPH-MCNC: 2.6 G/DL — LOW (ref 3.3–5)
ALP SERPL-CCNC: 49 U/L — SIGNIFICANT CHANGE UP (ref 40–120)
ALT FLD-CCNC: 15 U/L — SIGNIFICANT CHANGE UP (ref 12–78)
AMMONIA BLD-MCNC: <10 UMOL/L — LOW (ref 11–32)
ANION GAP SERPL CALC-SCNC: 9 MMOL/L — SIGNIFICANT CHANGE UP (ref 5–17)
AST SERPL-CCNC: 24 U/L — SIGNIFICANT CHANGE UP (ref 15–37)
BILIRUB SERPL-MCNC: 0.4 MG/DL — SIGNIFICANT CHANGE UP (ref 0.2–1.2)
BUN SERPL-MCNC: 21 MG/DL — SIGNIFICANT CHANGE UP (ref 7–23)
CALCIUM SERPL-MCNC: 8.8 MG/DL — SIGNIFICANT CHANGE UP (ref 8.5–10.1)
CHLORIDE SERPL-SCNC: 103 MMOL/L — SIGNIFICANT CHANGE UP (ref 96–108)
CO2 SERPL-SCNC: 29 MMOL/L — SIGNIFICANT CHANGE UP (ref 22–31)
CREAT SERPL-MCNC: 1.03 MG/DL — SIGNIFICANT CHANGE UP (ref 0.5–1.3)
FAT STL QN: NORMAL — SIGNIFICANT CHANGE UP
FAT STL QN: NORMAL — SIGNIFICANT CHANGE UP
GLUCOSE SERPL-MCNC: 119 MG/DL — HIGH (ref 70–99)
HCT VFR BLD CALC: 35.6 % — LOW (ref 39–50)
HGB BLD-MCNC: 11.6 G/DL — LOW (ref 13–17)
MCHC RBC-ENTMCNC: 30.1 PG — SIGNIFICANT CHANGE UP (ref 27–34)
MCHC RBC-ENTMCNC: 32.6 GM/DL — SIGNIFICANT CHANGE UP (ref 32–36)
MCV RBC AUTO: 92.2 FL — SIGNIFICANT CHANGE UP (ref 80–100)
NRBC # BLD: 0 /100 WBCS — SIGNIFICANT CHANGE UP (ref 0–0)
PLATELET # BLD AUTO: 195 K/UL — SIGNIFICANT CHANGE UP (ref 150–400)
POTASSIUM SERPL-MCNC: 3.6 MMOL/L — SIGNIFICANT CHANGE UP (ref 3.5–5.3)
POTASSIUM SERPL-SCNC: 3.6 MMOL/L — SIGNIFICANT CHANGE UP (ref 3.5–5.3)
PROT SERPL-MCNC: 6.3 GM/DL — SIGNIFICANT CHANGE UP (ref 6–8.3)
RBC # BLD: 3.86 M/UL — LOW (ref 4.2–5.8)
RBC # FLD: 13.1 % — SIGNIFICANT CHANGE UP (ref 10.3–14.5)
SODIUM SERPL-SCNC: 141 MMOL/L — SIGNIFICANT CHANGE UP (ref 135–145)
T PALLIDUM AB TITR SER: NEGATIVE — SIGNIFICANT CHANGE UP
VIT B12 SERPL-MCNC: 612 PG/ML — SIGNIFICANT CHANGE UP (ref 232–1245)
WBC # BLD: 4.82 K/UL — SIGNIFICANT CHANGE UP (ref 3.8–10.5)
WBC # FLD AUTO: 4.82 K/UL — SIGNIFICANT CHANGE UP (ref 3.8–10.5)

## 2018-07-17 PROCEDURE — 70553 MRI BRAIN STEM W/O & W/DYE: CPT | Mod: 26

## 2018-07-17 PROCEDURE — 99239 HOSP IP/OBS DSCHRG MGMT >30: CPT

## 2018-07-17 RX ORDER — DEXAMETHASONE 0.5 MG/5ML
4 ELIXIR ORAL EVERY 6 HOURS
Qty: 0 | Refills: 0 | Status: DISCONTINUED | OUTPATIENT
Start: 2018-07-17 | End: 2018-07-17

## 2018-07-17 RX ORDER — DEXAMETHASONE 0.5 MG/5ML
8 ELIXIR ORAL ONCE
Qty: 0 | Refills: 0 | Status: COMPLETED | OUTPATIENT
Start: 2018-07-17 | End: 2018-07-17

## 2018-07-17 RX ORDER — LACTULOSE 10 G/15ML
15 SOLUTION ORAL
Qty: 0 | Refills: 0 | COMMUNITY
Start: 2018-07-17

## 2018-07-17 RX ORDER — LEVOTHYROXINE SODIUM 125 MCG
75 TABLET ORAL DAILY
Qty: 0 | Refills: 0 | Status: DISCONTINUED | OUTPATIENT
Start: 2018-07-17 | End: 2018-07-17

## 2018-07-17 RX ORDER — LACTOBACILLUS ACIDOPHILUS 100MM CELL
1 CAPSULE ORAL
Qty: 0 | Refills: 0 | COMMUNITY
Start: 2018-07-17

## 2018-07-17 RX ORDER — LEVOTHYROXINE SODIUM 125 MCG
50 TABLET ORAL DAILY
Qty: 0 | Refills: 0 | Status: DISCONTINUED | OUTPATIENT
Start: 2018-07-17 | End: 2018-07-17

## 2018-07-17 RX ORDER — ONDANSETRON 8 MG/1
4 TABLET, FILM COATED ORAL
Qty: 0 | Refills: 0 | COMMUNITY
Start: 2018-07-17

## 2018-07-17 RX ORDER — DEXAMETHASONE 0.5 MG/5ML
4 ELIXIR ORAL
Qty: 0 | Refills: 0 | COMMUNITY
Start: 2018-07-17

## 2018-07-17 RX ORDER — ACETAMINOPHEN 500 MG
2 TABLET ORAL
Qty: 0 | Refills: 0 | COMMUNITY
Start: 2018-07-17

## 2018-07-17 RX ORDER — METRONIDAZOLE 500 MG
500 TABLET ORAL
Qty: 0 | Refills: 0 | COMMUNITY
Start: 2018-07-17

## 2018-07-17 RX ORDER — LEVOTHYROXINE SODIUM 125 MCG
1 TABLET ORAL
Qty: 0 | Refills: 0 | COMMUNITY
Start: 2018-07-17

## 2018-07-17 RX ORDER — CIPROFLOXACIN LACTATE 400MG/40ML
400 VIAL (ML) INTRAVENOUS
Qty: 0 | Refills: 0 | COMMUNITY
Start: 2018-07-17

## 2018-07-17 RX ORDER — ENOXAPARIN SODIUM 100 MG/ML
40 INJECTION SUBCUTANEOUS
Qty: 0 | Refills: 0 | COMMUNITY
Start: 2018-07-17

## 2018-07-17 RX ADMIN — ENOXAPARIN SODIUM 40 MILLIGRAM(S): 100 INJECTION SUBCUTANEOUS at 07:11

## 2018-07-17 RX ADMIN — Medication 100 MILLIGRAM(S): at 14:14

## 2018-07-17 RX ADMIN — LACTULOSE 10 GRAM(S): 10 SOLUTION ORAL at 14:14

## 2018-07-17 RX ADMIN — Medication 4 MILLIGRAM(S): at 17:24

## 2018-07-17 RX ADMIN — Medication 200 MILLIGRAM(S): at 05:11

## 2018-07-17 RX ADMIN — Medication 200 MILLIGRAM(S): at 17:24

## 2018-07-17 RX ADMIN — Medication 101.6 MILLIGRAM(S): at 14:13

## 2018-07-17 RX ADMIN — Medication 1 DROP(S): at 09:01

## 2018-07-17 RX ADMIN — Medication 1 TABLET(S): at 14:13

## 2018-07-17 RX ADMIN — Medication 50 MICROGRAM(S): at 14:13

## 2018-07-17 RX ADMIN — Medication 100 MILLIGRAM(S): at 05:11

## 2018-07-17 RX ADMIN — Medication 50 MICROGRAM(S): at 05:12

## 2018-07-17 NOTE — PROGRESS NOTE ADULT - SUBJECTIVE AND OBJECTIVE BOX
Pt stable - discharge planning in progress  on antibx      MEDICATIONS  (STANDING):  ciprofloxacin   IVPB      ciprofloxacin   IVPB 400 milliGRAM(s) IV Intermittent every 12 hours  enoxaparin Injectable 40 milliGRAM(s) SubCutaneous every 24 hours  lactobacillus acidophilus 1 Tablet(s) Oral daily  lactulose Syrup 10 Gram(s) Oral daily  levothyroxine 50 MICROGram(s) Oral daily  metroNIDAZOLE  IVPB 500 milliGRAM(s) IV Intermittent every 8 hours  metroNIDAZOLE  IVPB        MEDICATIONS  (PRN):  acetaminophen   Tablet 650 milliGRAM(s) Oral every 6 hours PRN For Temp greater than 38 C (100.4 F)  artificial  tears Solution 1 Drop(s) Both EYES every 3 hours PRN Dry Eyes  ondansetron Injectable 4 milliGRAM(s) IV Push every 6 hours PRN Nausea and/or Vomiting      Allergies    No Known Allergies    Intolerances        Vital Signs Last 24 Hrs  T(C): 36.1 (17 Jul 2018 05:27), Max: 36.8 (16 Jul 2018 17:00)  T(F): 97 (17 Jul 2018 05:27), Max: 98.2 (16 Jul 2018 17:00)  HR: 44 (17 Jul 2018 05:27) (44 - 99)  BP: 126/64 (17 Jul 2018 05:27) (125/82 - 152/73)  BP(mean): --  RR: 16 (17 Jul 2018 05:27) (16 - 16)  SpO2: 96% (17 Jul 2018 05:27) (95% - 99%)    PHYSICAL EXAM:  General: NAD.  CVS: S1, S2  Chest: air entry bilaterally present  Abd: BS present, soft, non-tender      LABS:                        11.6   4.82  )-----------( 195      ( 17 Jul 2018 09:32 )             35.6     07-17    141  |  103  |  21  ----------------------------<  119<H>  3.6   |  29  |  1.03    Ca    8.8      17 Jul 2018 09:32    TPro  6.3  /  Alb  2.6<L>  /  TBili  0.4  /  DBili  x   /  AST  24  /  ALT  15  /  AlkPhos  49  07-17        diet as tolerated  PO antibx upon d/c x 1 week

## 2018-07-17 NOTE — PROGRESS NOTE ADULT - PROBLEM SELECTOR PROBLEM 1
Adult hypothyroidism
Adult hypothyroidism
Pleural effusion, malignant

## 2018-07-17 NOTE — DISCHARGE NOTE ADULT - NSTOBACCOHOTLINE_GEN_A_CS
[facility] Smokers Quitline ([id number]) [facility] Smokers Quitline ([phone])

## 2018-07-17 NOTE — DISCHARGE NOTE ADULT - CARE PLAN
Principal Discharge DX:	Malignant neoplasm of left lung, unspecified part of lung  Goal:	keep stable as possible  Assessment and plan of treatment:	Transfer to [facility] under oncologist Dr. Elliot Ricci for radiation oncology and further management of metastatic lung cancer.  Secondary Diagnosis:	Colitis  Goal:	Prevent future episodes.  Assessment and plan of treatment:	Continue antibiotics for another 7 days, per GI recommendations.  Secondary Diagnosis:	Pleural effusion, malignant  Goal:	Prevent future reaccumulation.  Assessment and plan of treatment:	Thoracentesis as needed.

## 2018-07-17 NOTE — DISCHARGE NOTE ADULT - PATIENT PORTAL LINK FT
You can access the [facility] Patient Portal, offered by [facility], by registering with the following website: [email or web address]

## 2018-07-17 NOTE — PROGRESS NOTE ADULT - SUBJECTIVE AND OBJECTIVE BOX
Patient is a 73y old  Male who presents with a chief complaint of diarrhea (12 Jul 2018 01:49)      INTERVAL HPI/OVERNIGHT EVENTS:  pt with no complaints  eating well, feeling ok  tsh 11    MEDICATIONS  (STANDING):  ciprofloxacin   IVPB      ciprofloxacin   IVPB 400 milliGRAM(s) IV Intermittent every 12 hours  enoxaparin Injectable 40 milliGRAM(s) SubCutaneous every 24 hours  lactobacillus acidophilus 1 Tablet(s) Oral daily  lactulose Syrup 10 Gram(s) Oral daily  levothyroxine 50 MICROGram(s) Oral daily  metroNIDAZOLE  IVPB 500 milliGRAM(s) IV Intermittent every 8 hours  metroNIDAZOLE  IVPB        MEDICATIONS  (PRN):  acetaminophen   Tablet 650 milliGRAM(s) Oral every 6 hours PRN For Temp greater than 38 C (100.4 F)  artificial  tears Solution 1 Drop(s) Both EYES every 3 hours PRN Dry Eyes  ondansetron Injectable 4 milliGRAM(s) IV Push every 6 hours PRN Nausea and/or Vomiting      REVIEW OF SYSTEMS:  CONSTITUTIONAL: No fever, weight loss, or fatigue  RESPIRATORY: No cough, wheezing, chills or hemoptysis; No shortness of breath  CARDIOVASCULAR: No chest pain, palpitations, dizziness, or leg swelling  GASTROINTESTINAL: No abdominal or epigastric pain. No nausea, vomiting, or hematemesis; No diarrhea or constipation. No melena or hematochezia.  ENDOCRINE: No heat or cold intolerance; No hair loss      Vital Signs Last 24 Hrs  T(C): 36.1 (17 Jul 2018 05:27), Max: 36.8 (16 Jul 2018 17:00)  T(F): 97 (17 Jul 2018 05:27), Max: 98.2 (16 Jul 2018 17:00)  HR: 44 (17 Jul 2018 05:27) (44 - 99)  BP: 126/64 (17 Jul 2018 05:27) (125/82 - 152/73)  BP(mean): --  RR: 16 (17 Jul 2018 05:27) (16 - 16)  SpO2: 96% (17 Jul 2018 05:27) (95% - 99%)    PHYSICAL EXAM:  GENERAL: NAD, well-groomed, well-developed  CHEST/LUNG: Clear to percussion bilaterally; No rales, rhonchi, wheezing, or rubs        LABS:              CAPILLARY BLOOD GLUCOSE        Lipid panel:               RADIOLOGY & ADDITIONAL TESTS:

## 2018-07-17 NOTE — PROGRESS NOTE ADULT - SUBJECTIVE AND OBJECTIVE BOX
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  but currently living with dtr  PMH HTN, Metastatic lung cancer diagnosed 9/2017 Raghav Chavez has had ct and rt, lung cancer; malignant pl effusion Rxed with Nivoluman (OPTIVO)  was sent in with diarrhea when he found by family covered in feces and family declaring that they cannot take care of him at home Patient was admitted [facility] 7/11/2018 Patient was started on cipro flagyl (7/11/2018) for colitis and IR thoracentesis is arranged for large R pl effsn tbd 7/13/2018 Pulm consulted 7/12/2018   ONCOLOGIST Dr Elliot Ricci [phone]     VITALS/LABS                           7/17/2018 afeb 97 140/70 18 95%   7/17/2018 W 4.8 Hb 11.6 Plt 195 Na 141 K 3.6 CO2 29 Cr 1     REVIEW OF SYMPTOMS    Able to give ROS  Yes     RELIABLE No   CONSTITUTIONAL Weakness Yes  Chills No Vision changes No  ENDOCRINE No unexplained hair loss No heat or cold intolerance    ALLERGY No hives  Sore throat No   RESP Coughing blood no  Shortness of breath YES   NEURO No Headache  Confusion Pain neck No   CARDIAC No Chest pain No Palpitations   GI No Pain abdomen NO   Vomiting NO     PHYSICAL EXAM    HEENT Unremarkable PERRLA atraumatic   RESP Fair air entry EXP prolonged    Harsh breath sound Resp distres mild   CARDIAC S1 S2 No S3     NO JVD    ABDOMEN SOFT BS PRESENT NOT DISTENDED No hepatosplenomegaly PEDAL EDEMA present No calf tenderness  NO rash   GENERAL Not TOXIC looking    PATIENT DATA ASSESSMENT RECOMMENDATIONS  7/11/2018 ADMISSION [facility]                        RESP   7/13/2018 RA 96%   ASSESMENT RECOMMENDATIONS   Clinically stable   Target PO 90-95% adjust O2     LYMPH PREDOMINANT EXUDATIVE PLEURAL EFFSNS   PFA  R PFA 7/13/2018 L 286/404 (.7)  Pr 4.4/6.7 (.65)  G 107 pH 7.7 l 77 Cyto n   CT  7/11/2018 CTAP oc ic   cc diffuse abdominal pain and diarrhea   1) BL pl effsns small on l and mod and loculated on r   6/17/2018 CT Ch   1) Mod bl pl effsns   ASSESSMENT RECOMMENDATIONS   7/14/2018 Pleural effusion is lymph predominanty exudate with high pH This is consistent with malignant effsn Cyto pending   If recurrent may need pleurex or VATS     LUNG NODULES   7/11/2018 CTAP oc ic   1) Basal patchy opacities with more focal enhancing opacity R base   2) Multiple nodules lll upto 9 mm     6/17/2018 CT Ch   1)  Scattered nodular opacities both lungs hawa r lobe measuring upto 1.5 cm   2) Perihilar ggo   ASSESSMENT RECOMMENDATIONS   Likely metastatic cancer based on available history     BRADYCARDIA  7/13/2018 HR 45   7/14/2018 TSH 11.6   ECHO 7/15 ef 55%   Levoxyl 50 (7/14/2018)  ASSESSMENT RECOMMENDATIONS  7/14/2018 Hypothyroid Started on Levoxyl        COLITIS   7/11/2018 CTAP oc ic   cc diffuse abdominal pain and diarrhea   1) Large stool in rectum   2) Mild rectal wall thickening   3) Multiple enlarged rpln  7/13 Giardia ag n   7/13 stool c n   7/13 C diff n   ASSESSMENT RECOMMENDATIONS   Possibly sec to Nivoluman (OPTIVO)   On cip (7/11) flagyl (7/11)     BRAIN METS   7/17/2018 Brain MR showed multiple mets and decadron was started Pnco recommended transfer for RT Tr to Dr Radhames Ndiaye     GLOBAL ISSUE/BEST PRACTICE:      PROBLEM: HOB elevation:   y            PROBLEM: Stress ulcer proph:    na                      PROBLEM: VTE prophylaxis:      lvnx 40 (7/11)   PROBLEM: Glycemic control:    na  PROBLEM: Nutrition:    Reg diet (7/11)   PROBLEM: Advanced directive: na     PROBLEM: ALLERGY nka   CONTACT Child Aranza Nelson  self        TIME SPENT Over 25 minutes aggregate care time spent on encounter; activities included   direct patient care, counseling and/or coordinating care reviewing notes, lab data/ imaging , discussion with multidisciplinary team/ patient  /family

## 2018-07-17 NOTE — DISCHARGE NOTE ADULT - PLAN OF CARE
Transfer to [facility] under oncologist Dr. Elliot Ricci for radiation oncology and further management of metastatic lung cancer. Prevent future episodes. Continue antibiotics for another 7 days, per GI recommendations. Prevent future reaccumulation. Thoracentesis as needed. keep stable as possible

## 2018-07-17 NOTE — PROGRESS NOTE ADULT - SUBJECTIVE AND OBJECTIVE BOX
Assessment:  Bradycardia  Likely secondary to thyroid disease in a setting of CA  Post Chemo, echo with normal EF and only minimal pericardial effussion  Also, pain meds, cause mary ellen, Oxy  will monitor closely, SBP is maintained however,   Plan for FENRANDA after MRI

## 2018-07-17 NOTE — DISCHARGE NOTE ADULT - MEDICATION SUMMARY - MEDICATIONS TO TAKE
I will START or STAY ON the medications listed below when I get home from the hospital:    dexamethasone  -- 4 milligram(s) intravenous every 6 hours  -- Indication: For Brain edema    metroNIDAZOLE 500 mg/100 mL intravenous solution  -- 500 megabecquerel(s) intravenous every 8 hours  -- Indication: For Colitis    acetaminophen 325 mg oral tablet  -- 2 tab(s) by mouth every 6 hours, As needed, For Temp greater than 38 C (100.4 F)  -- Indication: For Pain/fever    enoxaparin  -- 40 milligram(s) subcutaneous once a day  -- Indication: For DVT prophylaxis    ondansetron 2 mg/mL injectable solution  -- 4 milligram(s) injectable every 6 hours, As Needed  -- Indication: For Nausea    lactulose 10 g/15 mL oral syrup  -- 15 milliliter(s) by mouth once a day  -- Indication: For Constipation    ocular lubricant ophthalmic solution  -- 1 drop(s) to each affected eye every 3 hours, As needed, Dry Eyes  -- Indication: For Eye drop    lactobacillus acidophilus oral capsule  -- 1 dose(s) by mouth once a day  -- Indication: For Preventive measure    ciprofloxacin  -- 400 milligram(s) intravenous 2 times a day  -- Indication: For Colitis    levothyroxine 50 mcg (0.05 mg) oral tablet  -- 1 tab(s) by mouth once a day  -- Indication: For Hypothyroid

## 2018-07-17 NOTE — DISCHARGE NOTE ADULT - HOSPITAL COURSE
Patient treated for colitis attributed to recent chemotherapeutic medication.  Patient seen by GI and course of cipro and metronidazole recommended.  Patient's symptoms improved.  He was seen by pulmonology and had thoracentesis for malignant effusion.  Patient had CT head not diagnostic but had MRI brain for memory loss and mild cofusion.  MRI brain showed numerous metastatic lesions with vasogenic edema.  Oncology consulted and recommend transfer for radiation oncology.  Decadron started.  Patient's outpatient oncologist, Dr. Elliot Ricci, contacted and accepted patient for transfer to [facility]. Patient treated for colitis attributed to recent chemotherapeutic medication, nivolumab.  Patient seen by GI and course of cipro and metronidazole recommended.  Patient's GI symptoms improved.  He was seen by pulmonology and had thoracentesis for malignant effusion.  Patient had CT head not diagnostic but had MRI brain for memory loss and mild confusion.  MRI brain showed numerous metastatic lesions with vasogenic edema.  Decadron started.  Oncology consulted and recommend transfer for radiation oncology.  Patient's outpatient oncologist, Dr. Elliot Ricci, contacted and accepted patient for transfer to [facility].

## 2018-07-18 LAB
CULTURE RESULTS: SIGNIFICANT CHANGE UP
GRAM STN FLD: SIGNIFICANT CHANGE UP
SPECIMEN SOURCE: SIGNIFICANT CHANGE UP

## 2018-07-24 DIAGNOSIS — E43 UNSPECIFIED SEVERE PROTEIN-CALORIE MALNUTRITION: ICD-10-CM

## 2018-07-24 DIAGNOSIS — C34.92 MALIGNANT NEOPLASM OF UNSPECIFIED PART OF LEFT BRONCHUS OR LUNG: ICD-10-CM

## 2018-07-24 DIAGNOSIS — R00.1 BRADYCARDIA, UNSPECIFIED: ICD-10-CM

## 2018-07-24 DIAGNOSIS — C79.31 SECONDARY MALIGNANT NEOPLASM OF BRAIN: ICD-10-CM

## 2018-07-24 DIAGNOSIS — K52.89 OTHER SPECIFIED NONINFECTIVE GASTROENTERITIS AND COLITIS: ICD-10-CM

## 2018-07-24 DIAGNOSIS — Z85.028 PERSONAL HISTORY OF OTHER MALIGNANT NEOPLASM OF STOMACH: ICD-10-CM

## 2018-07-24 DIAGNOSIS — Z92.3 PERSONAL HISTORY OF IRRADIATION: ICD-10-CM

## 2018-07-24 DIAGNOSIS — I10 ESSENTIAL (PRIMARY) HYPERTENSION: ICD-10-CM

## 2018-07-24 DIAGNOSIS — E03.9 HYPOTHYROIDISM, UNSPECIFIED: ICD-10-CM

## 2018-07-24 DIAGNOSIS — J91.0 MALIGNANT PLEURAL EFFUSION: ICD-10-CM

## 2018-07-24 DIAGNOSIS — Z92.21 PERSONAL HISTORY OF ANTINEOPLASTIC CHEMOTHERAPY: ICD-10-CM

## 2018-09-01 LAB
CULTURE RESULTS: SIGNIFICANT CHANGE UP
NIGHT BLUE STAIN TISS: SIGNIFICANT CHANGE UP
SPECIMEN SOURCE: SIGNIFICANT CHANGE UP

## 2019-06-28 NOTE — ED ADULT TRIAGE NOTE - BMI (KG/M2)
16.8 [Fatigue] : fatigue [Muscle Weakness] : muscle weakness [Negative] : Respiratory [Incontinence] : no incontinence [Dysuria] : no dysuria [Frequency] : no frequency [Hesitancy] : no hesitancy [Hematuria] : no hematuria [Joint Stiffness] : no joint stiffness [Joint Pain] : no joint pain [Back Pain] : no back pain [Joint Swelling] : no joint swelling [Muscle Pain] : no muscle pain [Headache] : no headache [Skin Rash] : no skin rash [Fainting] : no fainting [Confusion] : no confusion [Dizziness] : no dizziness [Memory Loss] : no memory loss [de-identified] : fatigue, weakness as noted

## 2021-01-14 NOTE — H&P ADULT - NSHPPOADEEPVENOUSTHROMB_GEN_A_CORE
Patient calling and Her Employer needs Specific dates on the letter  1/5/21--- 1/18/21 and then Refax Please  Marcela [facility] Unit Coordinator       no

## 2022-12-28 NOTE — DISCHARGE NOTE ADULT - CLICK TO LAUNCH ORM
. Libtayo Counseling- I discussed with the patient the risks of Libtayo including but not limited to nausea, vomiting, diarrhea, and bone or muscle pain.  The patient verbalized understanding of the proper use and possible adverse effects of Libtayo.  All of the patient's questions and concerns were addressed.

## 2023-10-04 NOTE — ED PROVIDER NOTE - TEMPLATE, MLM
Patient declined program at this time.    Saad Ruth RN  Care Transitions Program  [phone] M-F 8-4:30    
General
